# Patient Record
Sex: FEMALE | Race: WHITE | NOT HISPANIC OR LATINO | Employment: FULL TIME | URBAN - METROPOLITAN AREA
[De-identification: names, ages, dates, MRNs, and addresses within clinical notes are randomized per-mention and may not be internally consistent; named-entity substitution may affect disease eponyms.]

---

## 2021-12-22 ENCOUNTER — APPOINTMENT (OUTPATIENT)
Dept: MRI IMAGING | Facility: CLINIC | Age: 36
End: 2021-12-22
Attending: EMERGENCY MEDICINE
Payer: COMMERCIAL

## 2021-12-22 ENCOUNTER — HOSPITAL ENCOUNTER (EMERGENCY)
Facility: CLINIC | Age: 36
Discharge: ANOTHER HOSPITAL | End: 2021-12-23
Attending: EMERGENCY MEDICINE | Admitting: EMERGENCY MEDICINE
Payer: COMMERCIAL

## 2021-12-22 DIAGNOSIS — I63.9 ACUTE CVA (CEREBROVASCULAR ACCIDENT) (H): ICD-10-CM

## 2021-12-22 LAB
ANION GAP SERPL CALCULATED.3IONS-SCNC: 14 MMOL/L (ref 5–18)
APTT PPP: 31 SECONDS (ref 22–38)
ATRIAL RATE - MUSE: 59 BPM
BASOPHILS # BLD AUTO: 0 10E3/UL (ref 0–0.2)
BASOPHILS NFR BLD AUTO: 1 %
BUN SERPL-MCNC: 11 MG/DL (ref 8–22)
CALCIUM SERPL-MCNC: 9.5 MG/DL (ref 8.5–10.5)
CHLORIDE BLD-SCNC: 106 MMOL/L (ref 98–107)
CHOLEST SERPL-MCNC: 219 MG/DL
CO2 SERPL-SCNC: 19 MMOL/L (ref 22–31)
CREAT SERPL-MCNC: 0.63 MG/DL (ref 0.6–1.1)
DIASTOLIC BLOOD PRESSURE - MUSE: NORMAL MMHG
EOSINOPHIL # BLD AUTO: 0.2 10E3/UL (ref 0–0.7)
EOSINOPHIL NFR BLD AUTO: 3 %
ERYTHROCYTE [DISTWIDTH] IN BLOOD BY AUTOMATED COUNT: 11.9 % (ref 10–15)
ERYTHROCYTE [SEDIMENTATION RATE] IN BLOOD BY WESTERGREN METHOD: 12 MM/HR (ref 0–20)
GFR SERPL CREATININE-BSD FRML MDRD: >90 ML/MIN/1.73M2
GLUCOSE BLD-MCNC: 95 MG/DL (ref 70–125)
HBA1C MFR BLD: 5.3 %
HCT VFR BLD AUTO: 41.3 % (ref 35–47)
HDLC SERPL-MCNC: 46 MG/DL
HGB BLD-MCNC: 13.8 G/DL (ref 11.7–15.7)
HOLD SPECIMEN: NORMAL
IMM GRANULOCYTES # BLD: 0 10E3/UL
IMM GRANULOCYTES NFR BLD: 0 %
INR PPP: 0.95 (ref 0.85–1.15)
INTERPRETATION ECG - MUSE: NORMAL
LDLC SERPL CALC-MCNC: 151 MG/DL
LYMPHOCYTES # BLD AUTO: 2.8 10E3/UL (ref 0.8–5.3)
LYMPHOCYTES NFR BLD AUTO: 36 %
MCH RBC QN AUTO: 29.4 PG (ref 26.5–33)
MCHC RBC AUTO-ENTMCNC: 33.4 G/DL (ref 31.5–36.5)
MCV RBC AUTO: 88 FL (ref 78–100)
MONOCYTES # BLD AUTO: 0.4 10E3/UL (ref 0–1.3)
MONOCYTES NFR BLD AUTO: 5 %
NEUTROPHILS # BLD AUTO: 4.2 10E3/UL (ref 1.6–8.3)
NEUTROPHILS NFR BLD AUTO: 55 %
NRBC # BLD AUTO: 0 10E3/UL
NRBC BLD AUTO-RTO: 0 /100
P AXIS - MUSE: 50 DEGREES
PLATELET # BLD AUTO: 183 10E3/UL (ref 150–450)
POTASSIUM BLD-SCNC: 4.3 MMOL/L (ref 3.5–5)
PR INTERVAL - MUSE: 156 MS
QRS DURATION - MUSE: 92 MS
QT - MUSE: 410 MS
QTC - MUSE: 405 MS
R AXIS - MUSE: 33 DEGREES
RBC # BLD AUTO: 4.69 10E6/UL (ref 3.8–5.2)
SARS-COV-2 RNA RESP QL NAA+PROBE: NEGATIVE
SODIUM SERPL-SCNC: 139 MMOL/L (ref 136–145)
SYSTOLIC BLOOD PRESSURE - MUSE: NORMAL MMHG
T AXIS - MUSE: 31 DEGREES
TRIGL SERPL-MCNC: 112 MG/DL
VENTRICULAR RATE- MUSE: 59 BPM
WBC # BLD AUTO: 7.7 10E3/UL (ref 4–11)

## 2021-12-22 PROCEDURE — 70549 MR ANGIOGRAPH NECK W/O&W/DYE: CPT

## 2021-12-22 PROCEDURE — 85610 PROTHROMBIN TIME: CPT | Performed by: EMERGENCY MEDICINE

## 2021-12-22 PROCEDURE — 250N000013 HC RX MED GY IP 250 OP 250 PS 637: Performed by: EMERGENCY MEDICINE

## 2021-12-22 PROCEDURE — 80048 BASIC METABOLIC PNL TOTAL CA: CPT | Performed by: EMERGENCY MEDICINE

## 2021-12-22 PROCEDURE — C9803 HOPD COVID-19 SPEC COLLECT: HCPCS

## 2021-12-22 PROCEDURE — 258N000003 HC RX IP 258 OP 636: Performed by: EMERGENCY MEDICINE

## 2021-12-22 PROCEDURE — 99285 EMERGENCY DEPT VISIT HI MDM: CPT | Mod: 25

## 2021-12-22 PROCEDURE — 85652 RBC SED RATE AUTOMATED: CPT | Performed by: EMERGENCY MEDICINE

## 2021-12-22 PROCEDURE — 85730 THROMBOPLASTIN TIME PARTIAL: CPT | Performed by: EMERGENCY MEDICINE

## 2021-12-22 PROCEDURE — 36415 COLL VENOUS BLD VENIPUNCTURE: CPT | Performed by: EMERGENCY MEDICINE

## 2021-12-22 PROCEDURE — 86038 ANTINUCLEAR ANTIBODIES: CPT | Performed by: EMERGENCY MEDICINE

## 2021-12-22 PROCEDURE — 96375 TX/PRO/DX INJ NEW DRUG ADDON: CPT | Mod: 59

## 2021-12-22 PROCEDURE — 93005 ELECTROCARDIOGRAM TRACING: CPT | Performed by: EMERGENCY MEDICINE

## 2021-12-22 PROCEDURE — 70553 MRI BRAIN STEM W/O & W/DYE: CPT

## 2021-12-22 PROCEDURE — 250N000011 HC RX IP 250 OP 636: Performed by: EMERGENCY MEDICINE

## 2021-12-22 PROCEDURE — 70544 MR ANGIOGRAPHY HEAD W/O DYE: CPT

## 2021-12-22 PROCEDURE — 85025 COMPLETE CBC W/AUTO DIFF WBC: CPT | Performed by: EMERGENCY MEDICINE

## 2021-12-22 PROCEDURE — 96376 TX/PRO/DX INJ SAME DRUG ADON: CPT

## 2021-12-22 PROCEDURE — 96374 THER/PROPH/DIAG INJ IV PUSH: CPT | Mod: 59

## 2021-12-22 PROCEDURE — 96361 HYDRATE IV INFUSION ADD-ON: CPT

## 2021-12-22 PROCEDURE — 255N000002 HC RX 255 OP 636: Performed by: EMERGENCY MEDICINE

## 2021-12-22 PROCEDURE — 80061 LIPID PANEL: CPT | Performed by: EMERGENCY MEDICINE

## 2021-12-22 PROCEDURE — 87635 SARS-COV-2 COVID-19 AMP PRB: CPT | Performed by: EMERGENCY MEDICINE

## 2021-12-22 PROCEDURE — A9585 GADOBUTROL INJECTION: HCPCS | Performed by: EMERGENCY MEDICINE

## 2021-12-22 PROCEDURE — 83036 HEMOGLOBIN GLYCOSYLATED A1C: CPT | Performed by: EMERGENCY MEDICINE

## 2021-12-22 RX ORDER — ACETAMINOPHEN 325 MG/1
975 TABLET ORAL ONCE
Status: COMPLETED | OUTPATIENT
Start: 2021-12-22 | End: 2021-12-22

## 2021-12-22 RX ORDER — MORPHINE SULFATE 4 MG/ML
4 INJECTION, SOLUTION INTRAMUSCULAR; INTRAVENOUS
Status: DISCONTINUED | OUTPATIENT
Start: 2021-12-22 | End: 2021-12-22

## 2021-12-22 RX ORDER — LORAZEPAM 2 MG/ML
1 INJECTION INTRAMUSCULAR
Status: DISCONTINUED | OUTPATIENT
Start: 2021-12-22 | End: 2021-12-23 | Stop reason: HOSPADM

## 2021-12-22 RX ORDER — ONDANSETRON 2 MG/ML
4 INJECTION INTRAMUSCULAR; INTRAVENOUS ONCE
Status: COMPLETED | OUTPATIENT
Start: 2021-12-22 | End: 2021-12-22

## 2021-12-22 RX ORDER — SODIUM CHLORIDE 9 MG/ML
INJECTION, SOLUTION INTRAVENOUS CONTINUOUS
Status: DISCONTINUED | OUTPATIENT
Start: 2021-12-22 | End: 2021-12-23 | Stop reason: HOSPADM

## 2021-12-22 RX ORDER — HYDROMORPHONE HYDROCHLORIDE 1 MG/ML
1 INJECTION, SOLUTION INTRAMUSCULAR; INTRAVENOUS; SUBCUTANEOUS ONCE
Status: COMPLETED | OUTPATIENT
Start: 2021-12-22 | End: 2021-12-22

## 2021-12-22 RX ORDER — IBUPROFEN 600 MG/1
600 TABLET, FILM COATED ORAL ONCE
Status: COMPLETED | OUTPATIENT
Start: 2021-12-22 | End: 2021-12-22

## 2021-12-22 RX ORDER — GADOBUTROL 604.72 MG/ML
8 INJECTION INTRAVENOUS ONCE
Status: COMPLETED | OUTPATIENT
Start: 2021-12-22 | End: 2021-12-22

## 2021-12-22 RX ORDER — LORAZEPAM 2 MG/ML
1 INJECTION INTRAMUSCULAR
Status: DISCONTINUED | OUTPATIENT
Start: 2021-12-22 | End: 2021-12-22

## 2021-12-22 RX ORDER — ACETAMINOPHEN 325 MG/1
650 TABLET ORAL ONCE
Status: COMPLETED | OUTPATIENT
Start: 2021-12-22 | End: 2021-12-22

## 2021-12-22 RX ORDER — ONDANSETRON 2 MG/ML
8 INJECTION INTRAMUSCULAR; INTRAVENOUS ONCE
Status: COMPLETED | OUTPATIENT
Start: 2021-12-22 | End: 2021-12-22

## 2021-12-22 RX ORDER — ASPIRIN 81 MG/1
162 TABLET, CHEWABLE ORAL ONCE
Status: COMPLETED | OUTPATIENT
Start: 2021-12-22 | End: 2021-12-22

## 2021-12-22 RX ADMIN — ONDANSETRON 8 MG: 2 INJECTION INTRAMUSCULAR; INTRAVENOUS at 11:28

## 2021-12-22 RX ADMIN — GADOBUTROL 8 ML: 604.72 INJECTION INTRAVENOUS at 16:15

## 2021-12-22 RX ADMIN — HYDROMORPHONE HYDROCHLORIDE 1 MG: 1 INJECTION, SOLUTION INTRAMUSCULAR; INTRAVENOUS; SUBCUTANEOUS at 17:19

## 2021-12-22 RX ADMIN — ACETAMINOPHEN 650 MG: 325 TABLET ORAL at 22:48

## 2021-12-22 RX ADMIN — SODIUM CHLORIDE: 9 INJECTION, SOLUTION INTRAVENOUS at 19:00

## 2021-12-22 RX ADMIN — LORAZEPAM 1 MG: 2 INJECTION INTRAMUSCULAR; INTRAVENOUS at 14:28

## 2021-12-22 RX ADMIN — SODIUM CHLORIDE 1000 ML: 9 INJECTION, SOLUTION INTRAVENOUS at 17:18

## 2021-12-22 RX ADMIN — ONDANSETRON 4 MG: 2 INJECTION INTRAMUSCULAR; INTRAVENOUS at 19:01

## 2021-12-22 RX ADMIN — ASPIRIN 81 MG CHEWABLE TABLET 162 MG: 81 TABLET CHEWABLE at 17:18

## 2021-12-22 RX ADMIN — ACETAMINOPHEN 975 MG: 325 TABLET ORAL at 11:28

## 2021-12-22 RX ADMIN — IBUPROFEN 600 MG: 600 TABLET ORAL at 22:48

## 2021-12-22 ASSESSMENT — ENCOUNTER SYMPTOMS
NUMBNESS: 1
NAUSEA: 1
HEADACHES: 1
SPEECH DIFFICULTY: 0

## 2021-12-22 ASSESSMENT — MIFFLIN-ST. JEOR: SCORE: 1458.79

## 2021-12-22 NOTE — ED TRIAGE NOTES
Patient states she has had numbness/weakness in her right fingertips starting at 0700 yesterday which has become progressively worse.  She was recently ruled out for carpel tunnel.  Also c/o migraine, she has a history of getting and states it is normal for her, peripheral vision off where she can't see things. Running into walls.   Sent here by  for MRI for changes.

## 2021-12-22 NOTE — ED PROVIDER NOTES
Emergency Department Encounter     Evaluation Date & Time:   12/22/2021  9:48 AM    CHIEF COMPLAINT:  Numbness      Triage Note:Patient states she has had numbness/weakness in her right fingertips starting at 0700 yesterday which has become progressively worse.  She was recently ruled out for carpel tunnel.  Also c/o migraine, she has a history of getting and states it is normal for her, peripheral vision off where she can't see things. Running into walls.   Sent here by  for MRI for changes.    Impression and Plan     ED COURSE & MEDICAL DECISION MAKING:  10:06 AM I met with the patient, obtained history, performed an initial exam, and discussed options and plan for diagnostics and treatment here in the ED. PPE worn: N95, eye protection, gloves, hair cover.  6:37 PM patient signed out to Dr. Mena. If no neuro telemetry bed can be found tonight then would have him consult neurology for further assistance in management over the course of the coming days while waiting for neuro telemetry bed to be available.    ED Course as of 12/22/21 1838   Wed Dec 22, 2021   1206 Onset of symptoms was yesterday morning at 7 AM so no stroke code was called.  Certainly stroke is a possibility with her right hand numbness and associated vision changes.  However, her vision change suggest that this is a peripheral issue either optic nerve or posterior eye.  I am having her speak with her eye doctor while we are evaluating her brain here to see if they are able to get an appointment in the next couple of days to go in for a full eye check for that.  Differential certainly includes retinal hemorrhage, partial retinal detachment, or optic nerve abnormality/venous congestion but at this time she has no pain in that right eye and did not notice herself the loss of vision so though according to her  she has been newly running into things on her right side.  As far as the right hand numbness although she states she has had  evaluation done already and they have not found any signs of carpal tunnel this may now be the development of carpal tunnel syndrome especially as she has been doing more exercises of her hands over the course of the past few months.  Certainly otherwise may be a more diffuse patchy abnormality of the brain such as demyelinating disease, mass, or emboli.  The right hand may also be a focal neuropathy from pressure or position as she is typing doing her daily work.  The difficulty concentrating may be secondary to her recurring migraine.  Less likely this is hemorrhage but obviously her blood pressure was up when she arrived so we will find out when we do her imaging.  She is stable at this time.   1209 Ekg is unremarkable.   1422 Unfortunately the patient had marked on her MRI checklist that she was claustrophobic. Nursing staff had not had a chance to let me know prior to her going over and she did have a panic attack. She is now recovering from that but still feeling anxious and so we are going to give her medication now for anxiety and then she is willing to try again to get the MRI done.   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  Will treat with fluids and aspirin.  rn did bedside dysphagia screen and she has passed, can eat dinner.    At the conclusion of the encounter I discussed the results of all the tests and the disposition. The questions were answered. The patient or family acknowledged understanding and was agreeable with the care plan.      FINAL IMPRESSION:    ICD-10-CM    1. Acute CVA (cerebrovascular accident) (H)  I63.9        0 minutes of critical care time    MEDICATIONS GIVEN IN THE EMERGENCY DEPARTMENT:  Medications   LORazepam (ATIVAN) injection 1 mg (1 mg Intravenous Given 12/22/21 1428)   0.9% sodium chloride BOLUS (1,000 mLs Intravenous New Bag 12/22/21 1718)     Followed by   sodium chloride 0.9% infusion (has no administration in time  range)   ondansetron (ZOFRAN) injection 8 mg (8 mg Intravenous Given 12/22/21 1128)   acetaminophen (TYLENOL) tablet 975 mg (975 mg Oral Given 12/22/21 1128)   gadobutrol (GADAVIST) injection 8 mL (8 mLs Intravenous Given 12/22/21 1615)   HYDROmorphone (PF) (DILAUDID) injection 1 mg (1 mg Intravenous Given 12/22/21 1719)   aspirin (ASA) chewable tablet 162 mg (162 mg Oral Given 12/22/21 1718)       NEW PRESCRIPTIONS STARTED AT TODAY'S ED VISIT:  New Prescriptions    No medications on file     HPI     HPI   Samia Ordoñez is a 36 year old female with a pertinent history of migraines who presents to this ED by walk in for evaluation of right sided headache, vision loss found at urgent care, and r hand numbness.  Patient states that yesterday at about 7 AM she began having problems with right hand numbness and over the course of the day the right hand was having more intermittent episodes of numbness but throughout the day she was having increased difficulty actually coordinating the use of her right hand and to the point that it was affecting her typing and work.  She states that she has been evaluated for carpal tunnel because of some discomfort in her wrist but that they have found that there is no carpal tunnel and she has been doing hand strengthening exercises over the course of the past 6 months and has now just graduated that physical therapy and has been doing well.     Then overnight she developed a right-sided migraine over her right eyebrow and took her ibuprofen this morning at about 5 AM for that.  She took 800 mg of ibuprofen.  Typically that does help to get rid of the migraine if she can do that and get some sleep but this morning it is not getting rid of the migraine.  She did not notice any loss of vision but her  did notice that yesterday she seemed to be running into things on her right side as well.  Otherwise no problems with speech.  Today she does feel like she is having some more  "problems with concentration.  Her typical migraine is right-sided and if it gets bad it will be accompanied by nausea but she has never had visual issues with her migraines previously.  She does wear glasses but has not noticed a loss of vision in her right eye.  She does have tubal ligation her last menstrual period was 2 weeks prior to presentation.    REVIEW OF SYSTEMS:  Review of Systems   Eyes: Positive for visual disturbance ( found patient was running into things on her right side).   Gastrointestinal: Positive for nausea (with migraine).   Neurological: Positive for numbness (R hand) and headaches (right sided). Negative for speech difficulty.       Medical History     History reviewed. No pertinent past medical history.    History reviewed. No pertinent surgical history.    History reviewed. No pertinent family history.    Social History     Tobacco Use     Smoking status: None     Smokeless tobacco: None   Substance Use Topics     Alcohol use: None     Drug use: None       No current outpatient medications on file.      Physical Exam     First Vitals:  Patient Vitals for the past 24 hrs:   BP Temp Temp src Pulse Resp SpO2 Height Weight   12/22/21 1837 -- -- -- 79 17 91 % -- --   12/22/21 1830 -- -- -- 84 20 92 % -- --   12/22/21 1815 -- -- -- 86 25 93 % -- --   12/22/21 1800 (!) 153/82 -- -- -- -- -- -- --   12/22/21 1756 -- -- -- 94 27 93 % -- --   12/22/21 1445 -- -- -- 77 -- 96 % -- --   12/22/21 1430 -- -- -- 75 -- 95 % -- --   12/22/21 1315 (!) 144/99 -- -- 78 18 97 % -- --   12/22/21 1300 (!) 141/100 -- -- 68 17 96 % -- --   12/22/21 1245 (!) 149/93 -- -- 74 18 98 % -- --   12/22/21 1230 139/83 -- -- 74 22 97 % -- --   12/22/21 1215 (!) 157/93 -- -- 74 15 97 % -- --   12/22/21 1200 (!) 160/80 -- -- 74 18 97 % -- --   12/22/21 1145 (!) 167/83 -- -- 70 16 96 % -- --   12/22/21 0952 (!) 162/100 98  F (36.7  C) Oral 83 16 96 % 1.626 m (5' 4\") 79.4 kg (175 lb)       PHYSICAL EXAM:   Constitutional: "   In nad sitting up in bed   HENT:  Normocephalic, posterior pharynx wnl, mucous membranes moist and dark pink   Eyes:  PERRL, EOMI, Conjunctiva normal, No discharge, no scleral icterus.  Respiratory:  Breathing easily, cta  Cardiovascular:  rrr but has frequent early beats about every 4th or 5th beat nl s1s2 0 murmurs, rubs, or gallops.  Peripheral pulses dp, pt, and radial are wnl.  no peripheral edema   GI:  Bowel sounds normal, Soft, No tenderness, No flank tenderness, nondistended.  :No CVA tenderness.   Musculoskeletal:  Moves all extremities.  No erythematous or swollen major joints,   Integument: normal color  Lymphatic:  No cervical lymphadenopathy  Neurologic:  Alert & oriented x 3, cn 2-12 show loss of vision peripherally only on her right eye.  The left vision is without visual field cut and she can read my badge with her glasses on.  Normal speech.  No aphasia.  Has numbness r hand only that is not exacerbated by flexion/extension of the wrist.  No weakness in either hand.  No facial or leg numbness.      [employer] Stroke Scale  Exam Interval: Baseline   Score    Level of consciousness: (0)   Alert, keenly responsive    LOC questions: (0)   Answers both questions correctly    LOC commands: (0)   Performs both tasks correctly    Best gaze: (0)   Normal    Visual: (1)   Partial hemianopia    Facial palsy: (0)   Normal symmetrical movements    Motor arm (left): (0)   No drift    Motor arm (right): (0)   No drift    Motor leg (left): (0)   No drift    Motor leg (right): (0)   No drift    Limb ataxia: (0)   Absent    Sensory: (1)   Mild to moderate sensory loss    Best language: (0)   Normal- no aphasia    Dysarthria: (0)   Normal    Extinction and inattention: (0)   No abnormality        Total Score:  2     Psychiatric:  Affect normal, Judgment normal, Mood normal.     Results     LAB:  All pertinent labs reviewed and interpreted  Results for orders placed or performed during the  hospital encounter of 12/22/21   MR Brain w/o & w Contrast     Status: None    Narrative    EXAM: MR BRAIN W/O AND W CONTRAST, MRA NECK (CAROTIDS) WO AND W CONTRAST, MRA BRAIN ([place] [facility]) WO CONTRAST  LOCATION: [facility]  DATE/TIME: 12/22/2021 1:24 PM    INDICATION: Onset yesterday of right hand numbness/coordination issues. Peripheral vision loss.  COMPARISON: None.  CONTRAST: 8 mL Gadavist (accession [id number]), 8 ml Gadavist (accession [id number]), 8 mL Gadavist IV.  TECHNIQUE:   1) Routine multiplanar multisequence head MRI without and with intravenous contrast.  2) 3D time-of-flight head MRA without intravenous contrast.  3) Neck MRA without and with IV contrast. Stenosis measurements made according to NASCET criteria unless otherwise specified.    FINDINGS:  HEAD MRI:  INTRACRANIAL CONTENTS: Patchy areas of FLAIR hyperintensity and restricted diffusion are seen within the left frontal parietal and occipital lobes demonstrating an MCA MAYO watershed type distribution and most evident along the left posterior parietal   region where the area of ischemia measures at least 2.3 x 2.6 cm in axial dimension. There is a small focus of restricted diffusion along the left corona radiata/upper margin left basal ganglia. No areas of abnormal parenchymal susceptibility. No areas   of abnormal enhancement.    Ventricles are normal in size. Otherwise normal gray-white matter differentiation. Although there is some gyriform swelling in the area of ischemia, no significant mass effect or midline shift.    Cerebellar tonsils are normally positioned. Visualized upper cervical spinal cord, sella, and corpus callosum are unremarkable. Major skull base vascular flow-voids are preserved.    OSSEOUS STRUCTURES/SOFT TISSUES: Visualized marrow space is unremarkable.     ORBITS: No abnormality accounting for technique.     SINUSES/MASTOIDS: Mild paranasal sinus mucosal thickening with dependent  air-fluid level right maxillary sinus suggesting active sinus disease. Middle ear cavities and mastoid air cells are clear.     HEAD MRA: Exam is at least mildly degraded by motion artifact.  ANTERIOR CIRCULATION: Intracranial internal carotid arteries are unremarkable. Although assessment of the middle cerebral arteries is degraded by motion artifact, there appears to be a short segment flow gap/filling defect within the M1 segment of the   left MCA spanning roughly 1.5 mm segment (axial source image 99). On henrry bolus MRA this corresponds to an area of apparent severe focal stenosis (source image 39 of series 18).    Best appreciated on the MIP images, there is a band of artifactual narrowing within the distal MCAs and ACAs. Otherwise there is grossly normal flow-related enhancement within the more distal left MCA, ACAs, and right middle cerebral artery.    POSTERIOR CIRCULATION: Left vertebral artery is slightly larger than the right. Vertebral and basilar arteries are unremarkable. Right posterior communicating artery is patent and provides majority of flow to the right posterior cerebral artery. Right P1   segment not identified. Posterior cerebral arteries otherwise patent.    NECK MRA:   RIGHT CAROTID: No measurable stenosis or dissection.    LEFT CAROTID: No measurable stenosis or dissection.    VERTEBRAL ARTERIES: Left vertebral artery is dominant. Both cervicovertebral arteries are patent.     AORTIC ARCH: Visualized aortic arch is unremarkable.      Impression    IMPRESSION:  HEAD MRI:   1.  Patchy areas of restricted diffusion are seen within the medial aspect of the left cerebral hemisphere demonstrating an MCA MAYO watershed type distribution. No finding for hemorrhagic transformation.    2.  Mild paranasal sinus mucosal thickening with right maxillary sinus dependent air-fluid level compatible with active sinus disease.    HEAD MRA:   1.  Exam is degraded by motion artifact.    2.  Short segment severe  stenosis M1 segment of the left MCA.    3.  Allowing for artifact, more distal middle and anterior cerebral arteries are patent.    NECK MRA:  1.  No significant stenosis either cervical carotid or vertebral system. No findings for dissection.    Findings called to Dr. Georges at 4:52 PM.   MRA Brain ([place] [facility] [place]) wo Contrast     Status: None    Narrative    EXAM: MR BRAIN W/O AND W CONTRAST, MRA NECK (CAROTIDS) WO AND W CONTRAST, MRA BRAIN ([place] [facility] [place]) WO CONTRAST  LOCATION: [facility]  DATE/TIME: 12/22/2021 1:24 PM    INDICATION: Onset yesterday of right hand numbness/coordination issues. Peripheral vision loss.  COMPARISON: None.  CONTRAST: 8 mL Gadavist (accession [id number]), 8 ml Gadavist (accession [id number]), 8 mL Gadavist IV.  TECHNIQUE:   1) Routine multiplanar multisequence head MRI without and with intravenous contrast.  2) 3D time-of-flight head MRA without intravenous contrast.  3) Neck MRA without and with IV contrast. Stenosis measurements made according to NASCET criteria unless otherwise specified.    FINDINGS:  HEAD MRI:  INTRACRANIAL CONTENTS: Patchy areas of FLAIR hyperintensity and restricted diffusion are seen within the left frontal parietal and occipital lobes demonstrating an MCA MAYO watershed type distribution and most evident along the left posterior parietal   region where the area of ischemia measures at least 2.3 x 2.6 cm in axial dimension. There is a small focus of restricted diffusion along the left corona radiata/upper margin left basal ganglia. No areas of abnormal parenchymal susceptibility. No areas   of abnormal enhancement.    Ventricles are normal in size. Otherwise normal gray-white matter differentiation. Although there is some gyriform swelling in the area of ischemia, no significant mass effect or midline shift.    Cerebellar tonsils are normally positioned. Visualized upper cervical spinal cord, sella, and corpus callosum are  unremarkable. Major skull base vascular flow-voids are preserved.    OSSEOUS STRUCTURES/SOFT TISSUES: Visualized marrow space is unremarkable.     ORBITS: No abnormality accounting for technique.     SINUSES/MASTOIDS: Mild paranasal sinus mucosal thickening with dependent air-fluid level right maxillary sinus suggesting active sinus disease. Middle ear cavities and mastoid air cells are clear.     HEAD MRA: Exam is at least mildly degraded by motion artifact.  ANTERIOR CIRCULATION: Intracranial internal carotid arteries are unremarkable. Although assessment of the middle cerebral arteries is degraded by motion artifact, there appears to be a short segment flow gap/filling defect within the M1 segment of the   left MCA spanning roughly 1.5 mm segment (axial source image 99). On henrry bolus MRA this corresponds to an area of apparent severe focal stenosis (source image 39 of series 18).    Best appreciated on the MIP images, there is a band of artifactual narrowing within the distal MCAs and ACAs. Otherwise there is grossly normal flow-related enhancement within the more distal left MCA, ACAs, and right middle cerebral artery.    POSTERIOR CIRCULATION: Left vertebral artery is slightly larger than the right. Vertebral and basilar arteries are unremarkable. Right posterior communicating artery is patent and provides majority of flow to the right posterior cerebral artery. Right P1   segment not identified. Posterior cerebral arteries otherwise patent.    NECK MRA:   RIGHT CAROTID: No measurable stenosis or dissection.    LEFT CAROTID: No measurable stenosis or dissection.    VERTEBRAL ARTERIES: Left vertebral artery is dominant. Both cervicovertebral arteries are patent.     AORTIC ARCH: Visualized aortic arch is unremarkable.      Impression    IMPRESSION:  HEAD MRI:   1.  Patchy areas of restricted diffusion are seen within the medial aspect of the left cerebral hemisphere demonstrating an MCA MAYO watershed type  distribution. No finding for hemorrhagic transformation.    2.  Mild paranasal sinus mucosal thickening with right maxillary sinus dependent air-fluid level compatible with active sinus disease.    HEAD MRA:   1.  Exam is degraded by motion artifact.    2.  Short segment severe stenosis M1 segment of the left MCA.    3.  Allowing for artifact, more distal middle and anterior cerebral arteries are patent.    NECK MRA:  1.  No significant stenosis either cervical carotid or vertebral system. No findings for dissection.    Findings called to Dr. Georges at 4:52 PM.   MRA Neck (Carotids) wo & w Contrast     Status: None    Narrative    EXAM: MR BRAIN W/O AND W CONTRAST, MRA NECK (CAROTIDS) WO AND W CONTRAST, MRA BRAIN ([place] [facility]) WO CONTRAST  LOCATION: [facility]  DATE/TIME: 12/22/2021 1:24 PM    INDICATION: Onset yesterday of right hand numbness/coordination issues. Peripheral vision loss.  COMPARISON: None.  CONTRAST: 8 mL Gadavist (accession [id number]), 8 ml Gadavist (accession [id number]), 8 mL Gadavist IV.  TECHNIQUE:   1) Routine multiplanar multisequence head MRI without and with intravenous contrast.  2) 3D time-of-flight head MRA without intravenous contrast.  3) Neck MRA without and with IV contrast. Stenosis measurements made according to NASCET criteria unless otherwise specified.    FINDINGS:  HEAD MRI:  INTRACRANIAL CONTENTS: Patchy areas of FLAIR hyperintensity and restricted diffusion are seen within the left frontal parietal and occipital lobes demonstrating an MCA MAYO watershed type distribution and most evident along the left posterior parietal   region where the area of ischemia measures at least 2.3 x 2.6 cm in axial dimension. There is a small focus of restricted diffusion along the left corona radiata/upper margin left basal ganglia. No areas of abnormal parenchymal susceptibility. No areas   of abnormal enhancement.    Ventricles are normal in size. Otherwise  normal gray-white matter differentiation. Although there is some gyriform swelling in the area of ischemia, no significant mass effect or midline shift.    Cerebellar tonsils are normally positioned. Visualized upper cervical spinal cord, sella, and corpus callosum are unremarkable. Major skull base vascular flow-voids are preserved.    OSSEOUS STRUCTURES/SOFT TISSUES: Visualized marrow space is unremarkable.     ORBITS: No abnormality accounting for technique.     SINUSES/MASTOIDS: Mild paranasal sinus mucosal thickening with dependent air-fluid level right maxillary sinus suggesting active sinus disease. Middle ear cavities and mastoid air cells are clear.     HEAD MRA: Exam is at least mildly degraded by motion artifact.  ANTERIOR CIRCULATION: Intracranial internal carotid arteries are unremarkable. Although assessment of the middle cerebral arteries is degraded by motion artifact, there appears to be a short segment flow gap/filling defect within the M1 segment of the   left MCA spanning roughly 1.5 mm segment (axial source image 99). On henrry bolus MRA this corresponds to an area of apparent severe focal stenosis (source image 39 of series 18).    Best appreciated on the MIP images, there is a band of artifactual narrowing within the distal MCAs and ACAs. Otherwise there is grossly normal flow-related enhancement within the more distal left MCA, ACAs, and right middle cerebral artery.    POSTERIOR CIRCULATION: Left vertebral artery is slightly larger than the right. Vertebral and basilar arteries are unremarkable. Right posterior communicating artery is patent and provides majority of flow to the right posterior cerebral artery. Right P1   segment not identified. Posterior cerebral arteries otherwise patent.    NECK MRA:   RIGHT CAROTID: No measurable stenosis or dissection.    LEFT CAROTID: No measurable stenosis or dissection.    VERTEBRAL ARTERIES: Left vertebral artery is dominant. Both cervicovertebral  arteries are patent.     AORTIC ARCH: Visualized aortic arch is unremarkable.      Impression    IMPRESSION:  HEAD MRI:   1.  Patchy areas of restricted diffusion are seen within the medial aspect of the left cerebral hemisphere demonstrating an MCA MAYO watershed type distribution. No finding for hemorrhagic transformation.    2.  Mild paranasal sinus mucosal thickening with right maxillary sinus dependent air-fluid level compatible with active sinus disease.    HEAD MRA:   1.  Exam is degraded by motion artifact.    2.  Short segment severe stenosis M1 segment of the left MCA.    3.  Allowing for artifact, more distal middle and anterior cerebral arteries are patent.    NECK MRA:  1.  No significant stenosis either cervical carotid or vertebral system. No findings for dissection.    Findings called to Dr. Georges at 4:52 PM.   Basic metabolic panel     Status: Abnormal   Result Value Ref Range    Sodium 139 136 - 145 mmol/L    Potassium 4.3 3.5 - 5.0 mmol/L    Chloride 106 98 - 107 mmol/L    Carbon Dioxide (CO2) 19 (L) 22 - 31 mmol/L    Anion Gap 14 5 - 18 mmol/L    Urea Nitrogen 11 8 - 22 mg/dL    Creatinine 0.63 0.60 - 1.10 mg/dL    Calcium 9.5 8.5 - 10.5 mg/dL    Glucose 95 70 - 125 mg/dL    GFR Estimate >90 >60 mL/min/1.73m2   INR     Status: Normal   Result Value Ref Range    INR 0.95 0.85 - 1.15   Partial thromboplastin time     Status: Normal   Result Value Ref Range    aPTT 31 22 - 38 Seconds   CBC with platelets and differential     Status: None   Result Value Ref Range    WBC Count 7.7 4.0 - 11.0 10e3/uL    RBC Count 4.69 3.80 - 5.20 10e6/uL    Hemoglobin 13.8 11.7 - 15.7 g/dL    Hematocrit 41.3 35.0 - 47.0 %    MCV 88 78 - 100 fL    MCH 29.4 26.5 - 33.0 pg    MCHC 33.4 31.5 - 36.5 g/dL    RDW 11.9 10.0 - 15.0 %    Platelet Count 183 150 - 450 10e3/uL    % Neutrophils 55 %    % Lymphocytes 36 %    % Monocytes 5 %    % Eosinophils 3 %    % Basophils 1 %    % Immature Granulocytes 0 %    NRBCs per 100 WBC  0 <1 /100    Absolute Neutrophils 4.2 1.6 - 8.3 10e3/uL    Absolute Lymphocytes 2.8 0.8 - 5.3 10e3/uL    Absolute Monocytes 0.4 0.0 - 1.3 10e3/uL    Absolute Eosinophils 0.2 0.0 - 0.7 10e3/uL    Absolute Basophils 0.0 0.0 - 0.2 10e3/uL    Absolute Immature Granulocytes 0.0 <=0.4 10e3/uL    Absolute NRBCs 0.0 10e3/uL   Extra Green Top (Lithium Heparin) Tube     Status: None   Result Value Ref Range    Hold Specimen [facility]    ECG 12-LEAD WITH MUSE (L[facility])     Status: None   Result Value Ref Range    Systolic Blood Pressure  mmHg    Diastolic Blood Pressure  mmHg    Ventricular Rate 59 BPM    Atrial Rate 59 BPM    [place] Interval 156 ms    QRS Duration 92 ms     ms    QTc 405 ms    P Axis 50 degrees    R AXIS 33 degrees    T Axis 31 degrees    Interpretation ECG       Sinus bradycardia with sinus arrhythmia  Cannot rule out Anterior infarct , age undetermined  Abnormal ECG  No previous ECGs available  Confirmed by SEE ED PROVIDER NOTE FOR, ECG INTERPRETATION ([id number]),  Khadijah Spence ([id number]) on 12/22/2021 12:06:58 PM     CBC with platelets differential     Status: None    Narrative    The following orders were created for panel order CBC with platelets differential.  Procedure                               Abnormality         Status                     ---------                               -----------         ------                     CBC with platelets and d...[[id number]]                      Final result                 Please view results for these tests on the individual orders.   [facility] Draw     Status: None    Narrative    The following orders were created for panel order [facility] Draw.  Procedure                               Abnormality         Status                     ---------                               -----------         ------                     Extra Green Top (Lithium...[[id number]]                      Final result                 Please view results for these tests on the individual orders.        RADIOLOGY:  MRA Neck (Carotids) wo & w Contrast   Final Result   IMPRESSION:   HEAD MRI:    1.  Patchy areas of restricted diffusion are seen within the medial aspect of the left cerebral hemisphere demonstrating an MCA MAYO watershed type distribution. No finding for hemorrhagic transformation.      2.  Mild paranasal sinus mucosal thickening with right maxillary sinus dependent air-fluid level compatible with active sinus disease.      HEAD MRA:    1.  Exam is degraded by motion artifact.      2.  Short segment severe stenosis M1 segment of the left MCA.      3.  Allowing for artifact, more distal middle and anterior cerebral arteries are patent.      NECK MRA:   1.  No significant stenosis either cervical carotid or vertebral system. No findings for dissection.      Findings called to Dr. Georges at 4:52 PM.      MRA Brain ([place]) wo Contrast   Final Result   IMPRESSION:   HEAD MRI:    1.  Patchy areas of restricted diffusion are seen within the medial aspect of the left cerebral hemisphere demonstrating an MCA MAYO watershed type distribution. No finding for hemorrhagic transformation.      2.  Mild paranasal sinus mucosal thickening with right maxillary sinus dependent air-fluid level compatible with active sinus disease.      HEAD MRA:    1.  Exam is degraded by motion artifact.      2.  Short segment severe stenosis M1 segment of the left MCA.      3.  Allowing for artifact, more distal middle and anterior cerebral arteries are patent.      NECK MRA:   1.  No significant stenosis either cervical carotid or vertebral system. No findings for dissection.      Findings called to Dr. Georges at 4:52 PM.      MR Brain w/o & w Contrast   Final Result   IMPRESSION:   HEAD MRI:    1.  Patchy areas of restricted diffusion are seen within the medial aspect of the left cerebral hemisphere demonstrating an MCA MAYO watershed type distribution. No finding for hemorrhagic transformation.      2.  Mild  paranasal sinus mucosal thickening with right maxillary sinus dependent air-fluid level compatible with active sinus disease.      HEAD MRA:    1.  Exam is degraded by motion artifact.      2.  Short segment severe stenosis M1 segment of the left MCA.      3.  Allowing for artifact, more distal middle and anterior cerebral arteries are patent.      NECK MRA:   1.  No significant stenosis either cervical carotid or vertebral system. No findings for dissection.      Findings called to Dr. Georges at 4:52 PM.          ECG:  Performed at: 1140    Impression: nsb with sinus arrhythmia, rate of 59.,  Poor r wave progression in v3 cannot rule out anterior infarct, no previous ekg available for comparison.  Pr 156ms, qrs 92ms, qtc 405ms, prt axes 50 33 31.      I have independently reviewed and interpreted the EKS(s) documented above    PROCEDURES:  Procedures:             The creation of this record is based on the scribe s observations of the work being performed by Jak Greer and the provider s statements to them. This document has been checked and approved by MD Dayanna Maynard MD  Emergency Medicine  [facility] EMERGENCY ROOM     Dayanna Georges MD  12/22/21 7421

## 2021-12-23 ENCOUNTER — HOSPITAL ENCOUNTER (INPATIENT)
Facility: CLINIC | Age: 36
LOS: 1 days | Discharge: HOME | DRG: 065 | End: 2021-12-24
Attending: PSYCHIATRY & NEUROLOGY | Admitting: PSYCHIATRY & NEUROLOGY
Payer: COMMERCIAL

## 2021-12-23 ENCOUNTER — APPOINTMENT (OUTPATIENT)
Dept: CARDIOLOGY | Facility: CLINIC | Age: 36
End: 2021-12-23
Attending: EMERGENCY MEDICINE
Payer: COMMERCIAL

## 2021-12-23 VITALS
HEART RATE: 88 BPM | TEMPERATURE: 98 F | DIASTOLIC BLOOD PRESSURE: 97 MMHG | BODY MASS INDEX: 29.88 KG/M2 | OXYGEN SATURATION: 98 % | RESPIRATION RATE: 22 BRPM | HEIGHT: 64 IN | WEIGHT: 175 LBS | SYSTOLIC BLOOD PRESSURE: 182 MMHG

## 2021-12-23 DIAGNOSIS — I63.89: ICD-10-CM

## 2021-12-23 DIAGNOSIS — I63.9 ACUTE ISCHEMIC STROKE (H): Primary | ICD-10-CM

## 2021-12-23 PROBLEM — E78.5 HYPERLIPIDEMIA LDL GOAL <70: Status: ACTIVE | Noted: 2021-12-23

## 2021-12-23 PROBLEM — I67.2 INTRACRANIAL ATHEROSCLEROSIS: Status: ACTIVE | Noted: 2021-12-23

## 2021-12-23 LAB — LVEF ECHO: NORMAL

## 2021-12-23 PROCEDURE — 250N000011 HC RX IP 250 OP 636: Performed by: EMERGENCY MEDICINE

## 2021-12-23 PROCEDURE — 93306 TTE W/DOPPLER COMPLETE: CPT | Mod: 26 | Performed by: INTERNAL MEDICINE

## 2021-12-23 PROCEDURE — 999N000208 ECHOCARDIOGRAM COMPLETE

## 2021-12-23 PROCEDURE — 250N000013 HC RX MED GY IP 250 OP 250 PS 637: Performed by: PSYCHIATRY & NEUROLOGY

## 2021-12-23 PROCEDURE — 250N000013 HC RX MED GY IP 250 OP 250 PS 637: Performed by: EMERGENCY MEDICINE

## 2021-12-23 PROCEDURE — 99221 1ST HOSP IP/OBS SF/LOW 40: CPT | Performed by: PSYCHIATRY & NEUROLOGY

## 2021-12-23 PROCEDURE — 258N000003 HC RX IP 258 OP 636: Performed by: EMERGENCY MEDICINE

## 2021-12-23 RX ORDER — ALBUTEROL SULFATE 90 UG/1
2 AEROSOL, METERED RESPIRATORY (INHALATION) EVERY 6 HOURS PRN
COMMUNITY

## 2021-12-23 RX ORDER — NAPROXEN 500 MG/1
500 TABLET ORAL 2 TIMES DAILY PRN
COMMUNITY

## 2021-12-23 RX ORDER — FENTANYL CITRATE 50 UG/ML
50 INJECTION, SOLUTION INTRAMUSCULAR; INTRAVENOUS ONCE
Status: COMPLETED | OUTPATIENT
Start: 2021-12-23 | End: 2021-12-23

## 2021-12-23 RX ORDER — ASPIRIN 81 MG/1
81 TABLET ORAL DAILY
Status: DISCONTINUED | OUTPATIENT
Start: 2021-12-24 | End: 2021-12-23 | Stop reason: HOSPADM

## 2021-12-23 RX ORDER — ASPIRIN 325 MG
325 TABLET ORAL DAILY
Status: DISCONTINUED | OUTPATIENT
Start: 2021-12-23 | End: 2021-12-23

## 2021-12-23 RX ORDER — ATORVASTATIN CALCIUM 10 MG/1
20 TABLET, FILM COATED ORAL DAILY
Status: DISCONTINUED | OUTPATIENT
Start: 2021-12-23 | End: 2021-12-23

## 2021-12-23 RX ORDER — ATORVASTATIN CALCIUM 40 MG/1
40 TABLET, FILM COATED ORAL DAILY
Status: DISCONTINUED | OUTPATIENT
Start: 2021-12-24 | End: 2021-12-23 | Stop reason: HOSPADM

## 2021-12-23 RX ADMIN — SODIUM CHLORIDE: 9 INJECTION, SOLUTION INTRAVENOUS at 04:57

## 2021-12-23 RX ADMIN — TICAGRELOR 90 MG: 90 TABLET ORAL at 19:48

## 2021-12-23 RX ADMIN — FENTANYL CITRATE 50 MCG: 50 INJECTION INTRAMUSCULAR; INTRAVENOUS at 08:15

## 2021-12-23 RX ADMIN — FENTANYL CITRATE 50 MCG: 50 INJECTION INTRAMUSCULAR; INTRAVENOUS at 12:10

## 2021-12-23 RX ADMIN — ATORVASTATIN CALCIUM 20 MG: 10 TABLET, FILM COATED ORAL at 11:44

## 2021-12-23 RX ADMIN — FENTANYL CITRATE 50 MCG: 50 INJECTION INTRAMUSCULAR; INTRAVENOUS at 00:43

## 2021-12-23 RX ADMIN — ASPIRIN 325 MG ORAL TABLET 325 MG: 325 PILL ORAL at 08:14

## 2021-12-23 RX ADMIN — SODIUM CHLORIDE 125 ML: 9 INJECTION, SOLUTION INTRAVENOUS at 14:03

## 2021-12-23 NOTE — ED PROVIDER NOTES
EMERGENCY DEPARTMENT SIGN OUT NOTE        ED COURSE AND MEDICAL DECISION MAKING  6:25 PM Patient was signed out to me by Dr Dayanna Georges.  8:52 PM I discussed the case with neurology, Dr. Ochoa.     In brief, Samia Ordoñez is a 36 year old female who initially presented with right hand numbness, right-sided headache, and vision changes. Onset of symptoms was yesterday morning at 7 AM so no stroke code was called.     She was signed out to me pending neurology consult and neuro tele bed placement.    I met the patient later on in this ED shift.  Unfortunately despite multiple calls there were no beds available for admission at stroke capable center.  I did discuss the case with Dr. Mejía of the neurology service here to continue her evaluation.  Added on some additional labs including A1c, lipid panel, KATIE, and ESR.  Ordered an echocardiogram to be completed tomorrow if the patient remains here.  Was given half dose aspirin by previous provider, will order full dose aspirin to start tomorrow.  At her age and with her significant stenosis, certainly will require admission for formal neurology consultation and continued expedited stroke evaluation.  She remained stable with a persistent headache but no other immediate concerns.  She was given some Tylenol, ibuprofen, and low-dose fentanyl.  Updated the patient and she was in agreement with the plan so far.  Patient to be signed out to overnight provider Dr. Kam.      FINAL IMPRESSION    1. Acute CVA (cerebrovascular accident) (H)        ED MEDS  Medications   LORazepam (ATIVAN) injection 1 mg (1 mg Intravenous Given 12/22/21 1428)   0.9% sodium chloride BOLUS (0 mLs Intravenous Stopped 12/22/21 1900)     Followed by   sodium chloride 0.9% infusion ( Intravenous New Bag 12/22/21 1900)   ondansetron (ZOFRAN) injection 8 mg (8 mg Intravenous Given 12/22/21 1128)   acetaminophen (TYLENOL) tablet 975 mg (975 mg Oral Given 12/22/21 1128)   gadobutrol (GADAVIST)  injection 8 mL (8 mLs Intravenous Given 12/22/21 1615)   HYDROmorphone (PF) (DILAUDID) injection 1 mg (1 mg Intravenous Given 12/22/21 1719)   aspirin (ASA) chewable tablet 162 mg (162 mg Oral Given 12/22/21 1718)   ondansetron (ZOFRAN) injection 4 mg (4 mg Intravenous Given 12/22/21 1901)       LAB  Labs Ordered and Resulted from Time of ED Arrival to Time of ED Departure   BASIC METABOLIC PANEL - Abnormal       Result Value    Sodium 139      Potassium 4.3      Chloride 106      Carbon Dioxide (CO2) 19 (*)     Anion Gap 14      Urea Nitrogen 11      Creatinine 0.63      Calcium 9.5      Glucose 95      GFR Estimate >90     LIPID REFLEX TO DIRECT LDL PANEL - Abnormal    Cholesterol 219 (*)     Triglycerides 112      Direct Measure HDL 46 (*)     LDL Cholesterol Calculated 151 (*)    INR - Normal    INR 0.95     PARTIAL THROMBOPLASTIN TIME - Normal    aPTT 31     COVID-19 VIRUS (CORONAVIRUS) BY PCR - Normal    SARS CoV2 PCR Negative     HEMOGLOBIN A1C - Normal    Hemoglobin A1C 5.3     CBC WITH PLATELETS AND DIFFERENTIAL    WBC Count 7.7      RBC Count 4.69      Hemoglobin 13.8      Hematocrit 41.3      MCV 88      MCH 29.4      MCHC 33.4      RDW 11.9      Platelet Count 183      % Neutrophils 55      % Lymphocytes 36      % Monocytes 5      % Eosinophils 3      % Basophils 1      % Immature Granulocytes 0      NRBCs per 100 WBC 0      Absolute Neutrophils 4.2      Absolute Lymphocytes 2.8      Absolute Monocytes 0.4      Absolute Eosinophils 0.2      Absolute Basophils 0.0      Absolute Immature Granulocytes 0.0      Absolute NRBCs 0.0     ANTI NUCLEAR HERNANDO IGG BY IFA WITH REFLEX   ERYTHROCYTE SEDIMENTATION RATE AUTO       RADIOLOGY    MRA Neck (Carotids) wo & w Contrast   Final Result   IMPRESSION:   HEAD MRI:    1.  Patchy areas of restricted diffusion are seen within the medial aspect of the left cerebral hemisphere demonstrating an MCA MAYO watershed type distribution. No finding for hemorrhagic transformation.       2.  Mild paranasal sinus mucosal thickening with right maxillary sinus dependent air-fluid level compatible with active sinus disease.      HEAD MRA:    1.  Exam is degraded by motion artifact.      2.  Short segment severe stenosis M1 segment of the left MCA.      3.  Allowing for artifact, more distal middle and anterior cerebral arteries are patent.      NECK MRA:   1.  No significant stenosis either cervical carotid or vertebral system. No findings for dissection.      Findings called to Dr. Georges at 4:52 PM.      MRA Brain ([place]) wo Contrast   Final Result   IMPRESSION:   HEAD MRI:    1.  Patchy areas of restricted diffusion are seen within the medial aspect of the left cerebral hemisphere demonstrating an MCA MAYO watershed type distribution. No finding for hemorrhagic transformation.      2.  Mild paranasal sinus mucosal thickening with right maxillary sinus dependent air-fluid level compatible with active sinus disease.      HEAD MRA:    1.  Exam is degraded by motion artifact.      2.  Short segment severe stenosis M1 segment of the left MCA.      3.  Allowing for artifact, more distal middle and anterior cerebral arteries are patent.      NECK MRA:   1.  No significant stenosis either cervical carotid or vertebral system. No findings for dissection.      Findings called to Dr. Georges at 4:52 PM.      MR Brain w/o & w Contrast   Final Result   IMPRESSION:   HEAD MRI:    1.  Patchy areas of restricted diffusion are seen within the medial aspect of the left cerebral hemisphere demonstrating an MCA MAYO watershed type distribution. No finding for hemorrhagic transformation.      2.  Mild paranasal sinus mucosal thickening with right maxillary sinus dependent air-fluid level compatible with active sinus disease.      HEAD MRA:    1.  Exam is degraded by motion artifact.      2.  Short segment severe stenosis M1 segment of the left MCA.      3.  Allowing for artifact, more distal middle and  anterior cerebral arteries are patent.      NECK MRA:   1.  No significant stenosis either cervical carotid or vertebral system. No findings for dissection.      Findings called to Dr. Georges at 4:52 PM.      Echocardiogram Complete    (Results Pending)         I, Alie Barakat, am serving as a scribe to document services personally performed by Dr. Mena, based on my observations and the provider's statements to me. I, Bridger Mena MD, attest that Alie Barakat is acting in a scribe capacity, has observed my performance of the services and has documented them in accordance with my direction.     Bridger Mena MD  Emergency Medicine  [facility] EMERGENCY ROOM  [facility] [facility] [address] [phone]       Bridger Mena MD  12/23/21 0031

## 2021-12-23 NOTE — ED PROVIDER NOTES
eMERGENCY dEPARTMENT PROGRESS NOTE      Patient was signed out to me by Dr. Mena at 1:49 AM.      HPI   Samia Ordoñez is a 36 year old female with a pertinent history of migraines who presents to this ED by walk in for evaluation of right sided headache, vision loss found at urgent care, and right hand numbness.  Patient states that yesterday at about 7 AM she began having problems with right hand numbness and over the course of the day the right hand was having more intermittent episodes of numbness but throughout the day, and she was having increased difficulty actually coordinating the use of her right hand and to the point that it was affecting her typing and work.     Then overnight she developed a right-sided migraine over her right eyebrow and took 800 mg ibuprofen this morning at about 5 AM. She did not have any resolution of her migraine. She did not notice any loss of vision but her  did notice that yesterday she seemed to be running into things on her right side as well.  Otherwise no problems with speech.  Today she does feel like she is having some more problems with concentration.  Her typical migraine is right-sided and if it gets bad it will be accompanied by nausea but she has never had visual issues with her migraines previously.  She does wear glasses but has not noticed a loss of vision in her right eye.    LABS  Pertinent lab results reviewed in chart.  Results for orders placed or performed during the hospital encounter of 12/22/21   MR Brain w/o & w Contrast    Impression    IMPRESSION:  HEAD MRI:   1.  Patchy areas of restricted diffusion are seen within the medial aspect of the left cerebral hemisphere demonstrating an MCA MAYO watershed type distribution. No finding for hemorrhagic transformation.    2.  Mild paranasal sinus mucosal thickening with right maxillary sinus dependent air-fluid level compatible with active sinus disease.    HEAD MRA:   1.  Exam is degraded by motion  artifact.    2.  Short segment severe stenosis M1 segment of the left MCA.    3.  Allowing for artifact, more distal middle and anterior cerebral arteries are patent.    NECK MRA:  1.  No significant stenosis either cervical carotid or vertebral system. No findings for dissection.    Findings called to Dr. Georges at 4:52 PM.   MRA Brain ([place]) wo Contrast    Impression    IMPRESSION:  HEAD MRI:   1.  Patchy areas of restricted diffusion are seen within the medial aspect of the left cerebral hemisphere demonstrating an MCA MAYO watershed type distribution. No finding for hemorrhagic transformation.    2.  Mild paranasal sinus mucosal thickening with right maxillary sinus dependent air-fluid level compatible with active sinus disease.    HEAD MRA:   1.  Exam is degraded by motion artifact.    2.  Short segment severe stenosis M1 segment of the left MCA.    3.  Allowing for artifact, more distal middle and anterior cerebral arteries are patent.    NECK MRA:  1.  No significant stenosis either cervical carotid or vertebral system. No findings for dissection.    Findings called to Dr. Georges at 4:52 PM.   MRA Neck (Carotids) wo & w Contrast    Impression    IMPRESSION:  HEAD MRI:   1.  Patchy areas of restricted diffusion are seen within the medial aspect of the left cerebral hemisphere demonstrating an MCA MAYO watershed type distribution. No finding for hemorrhagic transformation.    2.  Mild paranasal sinus mucosal thickening with right maxillary sinus dependent air-fluid level compatible with active sinus disease.    HEAD MRA:   1.  Exam is degraded by motion artifact.    2.  Short segment severe stenosis M1 segment of the left MCA.    3.  Allowing for artifact, more distal middle and anterior cerebral arteries are patent.    NECK MRA:  1.  No significant stenosis either cervical carotid or vertebral system. No findings for dissection.    Findings called to Dr. Georges at 4:52 PM.   Basic metabolic  panel   Result Value Ref Range    Sodium 139 136 - 145 mmol/L    Potassium 4.3 3.5 - 5.0 mmol/L    Chloride 106 98 - 107 mmol/L    Carbon Dioxide (CO2) 19 (L) 22 - 31 mmol/L    Anion Gap 14 5 - 18 mmol/L    Urea Nitrogen 11 8 - 22 mg/dL    Creatinine 0.63 0.60 - 1.10 mg/dL    Calcium 9.5 8.5 - 10.5 mg/dL    Glucose 95 70 - 125 mg/dL    GFR Estimate >90 >60 mL/min/1.73m2   Result Value Ref Range    INR 0.95 0.85 - 1.15   Partial thromboplastin time   Result Value Ref Range    aPTT 31 22 - 38 Seconds   CBC with platelets and differential   Result Value Ref Range    WBC Count 7.7 4.0 - 11.0 10e3/uL    RBC Count 4.69 3.80 - 5.20 10e6/uL    Hemoglobin 13.8 11.7 - 15.7 g/dL    Hematocrit 41.3 35.0 - 47.0 %    MCV 88 78 - 100 fL    MCH 29.4 26.5 - 33.0 pg    MCHC 33.4 31.5 - 36.5 g/dL    RDW 11.9 10.0 - 15.0 %    Platelet Count 183 150 - 450 10e3/uL    % Neutrophils 55 %    % Lymphocytes 36 %    % Monocytes 5 %    % Eosinophils 3 %    % Basophils 1 %    % Immature Granulocytes 0 %    NRBCs per 100 WBC 0 <1 /100    Absolute Neutrophils 4.2 1.6 - 8.3 10e3/uL    Absolute Lymphocytes 2.8 0.8 - 5.3 10e3/uL    Absolute Monocytes 0.4 0.0 - 1.3 10e3/uL    Absolute Eosinophils 0.2 0.0 - 0.7 10e3/uL    Absolute Basophils 0.0 0.0 - 0.2 10e3/uL    Absolute Immature Granulocytes 0.0 <=0.4 10e3/uL    Absolute NRBCs 0.0 10e3/uL   Extra Green Top (Lithium Heparin) Tube   Result Value Ref Range    Hold Specimen JI    Asymptomatic COVID-19 Virus (Coronavirus) by PCR Nasopharyngeal    Specimen: Nasopharyngeal; Swab   Result Value Ref Range    SARS CoV2 PCR Negative Negative   Lipid panel reflex to direct LDL   Result Value Ref Range    Cholesterol 219 (H) <=199 mg/dL    Triglycerides 112 <=149 mg/dL    Direct Measure HDL 46 (L) >=50 mg/dL    LDL Cholesterol Calculated 151 (H) <=129 mg/dL   Result Value Ref Range    Hemoglobin A1C 5.3 <=5.6 %   Erythrocyte sedimentation rate auto   Result Value Ref Range    Erythrocyte Sedimentation Rate 12  0 - 20 mm/hr   ECG 12-LEAD WITH MUSE (L[facility])   Result Value Ref Range    Systolic Blood Pressure  mmHg    Diastolic Blood Pressure  mmHg    Ventricular Rate 59 BPM    Atrial Rate 59 BPM    CO Interval 156 ms    QRS Duration 92 ms     ms    QTc 405 ms    P Axis 50 degrees    R AXIS 33 degrees    T Axis 31 degrees    Interpretation ECG       Sinus bradycardia with sinus arrhythmia  Cannot rule out Anterior infarct , age undetermined  Abnormal ECG  No previous ECGs available  Confirmed by SEE ED PROVIDER NOTE FOR, ECG INTERPRETATION ([id number]),  Khadijah Spence ([id number]) on 12/22/2021 12:06:58 PM         EKG  ***    RADIOLOGY  [unfilled]    PROCEDURES   ***    ED COURSE & MEDICAL DECISION MAKING      1:50 AM Patient signed out to me by Dr. Mena. Patient is awaiting neuro tele bed placement.    Pertinent Labs and Imagaing reviewed (see chart for details)    36 year old female ***    At the conclusion of the encounter I discussed  the results of all of the tests and the disposition.   The questions were answered.  The patient or family acknowledged understanding and was agreeable with the care plan.       FINAL IMPRESSION        1. Acute CVA (cerebrovascular accident) (H)          CRITICAL CARE    ***      I, Kin Molina, am serving as a scribe to document services personally performed by Jacinto Kam M.D. based on my observations and the provider's statements to me. I, Jacinto Kam M.D., attest that Kin Molina is acting in a scribe capacity, has observed my performance of the services and has documented them in accordance with my direction.      Jacinto Kam M.D.  [facility] EMERGENCY ROOM  [address] [facility] [address] [phone]  Dept: [phone]

## 2021-12-23 NOTE — PHARMACY-ADMISSION MEDICATION HISTORY
Pharmacy Note - Admission Medication History    Pertinent Provider Information: none     ______________________________________________________________________    Prior To Admission (PTA) med list completed and updated in EMR.       PTA Med List   Medication Sig Last Dose     albuterol (PROAIR HFA/PROVENTIL HFA/VENTOLIN HFA) 108 (90 Base) MCG/ACT inhaler Inhale 2 puffs into the lungs every 6 hours as needed for shortness of breath / dyspnea or wheezing Past Month at Unknown time     naproxen (NAPROSYN) 500 MG tablet Take 500 mg by mouth 2 times daily as needed for moderate pain Past Month at Unknown time       New med list    Info from patient in person,  Has no home medication with her    The information provided in this note is only as accurate as the sources available at the time of the update(s).    Thank you for the opportunity to participate in the care of this patient.    Jeronimo Guzman [facility]  12/23/2021 8:03 AM

## 2021-12-23 NOTE — CONSULTS
NEUROLOGY INPATIENT CONSULTATION NOTE       [facility]  [address] [place] [address]  Tel: [phone]  Fax: [phone]  [email or web address]     Samia Ordoñez,  1985, MRN [id number]  PCP: Sunny Chin  Date: 2021     ASSESSMENT & PLAN     Diagnosis code: Watershed infarction    Left MCA-MAYO watershed infarction; left M1 stenosis  36-year-old previously healthy female with history of miscarriage admitted with 2 days history of right hand numbness along with visual symptoms.  On exam she has right homonymous hemianopsia.  With her history of miscarriage, antiphospholipid antibody syndrome is a possibility    MRI of the head and MRA shows watershed infarct in the left MCA-MAYO territory with a short segment left M1 stenosis    Dual antiplatelet therapy with aspirin and Brilinta for 30 days.  Afterwards she can be switched to aspirin monotherapy    LDL is elevated at 151, increase Lipitor dose to 40 mg daily with goal of LDL less than 70    Echocardiogram shows normal ejection fraction with no significant valvular heart abnormality, cardiac source of emboli or right-to-left shunt    Hypercoagulable work-up to include antiphospholipid panel, activated protein C resistance, factor V Leiden mutation, MTHFR gene mutation, Antithrombin III, lactic acid, pyruvic acid, RPR, homocystine, antinuclear antibody    IV hydration with normal saline    Telemetry monitoring and if no cardiac arrhythmias detected, schedule 30-day event monitor    Physical, occupational, speech and pharmacy consult    Stroke education was provided    Thank you again for this referral, please feel free to contact me if you have any questions.    Gaurav Mohamud MD  [facility]  (Former[facility], [facility]     CHIEF COMPLAINT Cerebral infarction, watershed distribution, unilateral, acute (H)     HISTORY OF PRESENT ILLNESS     We have  been requested by Dr. Brock to evaluate Samia Ordoñez who is a 36 year old  female for CVA    Patient is a 36-year-old previously healthy female who presented to the emergency room with complaint of numbness and weakness in the right fingertips that started Tuesday morning.  According to patient along with right arm numbness she had some visual symptoms and realized she could not see clearly through her right side but did not think much about it and went to work.  She took Wednesday of and as her symptoms are not getting better she decided to come to the emergency room.  She denies any weakness in her right arm, facial droop, speech difficulty, loss of consciousness.  She subsequently had MRI of the brain that showed patchy areas of restricted diffusion in a watershed distribution on MCA and MAYO distribution additionally patient had short segment severe stenosis of the left M1.  No significant stenosis noted in internal carotids.  Echocardiogram with bubble study was done that was negative for right-to-left shunt with no evidence of cardiac source of emboli.      Patient denies any headaches, history of autoimmune disorders but does admit she had a miscarriage and her one child was born premature.  There is no family history of CVA or MI at a relatively young age.  She is also not taking any medication or birth control pills     PROBLEM LIST      Patient Active Problem List   Diagnosis Code     Pain in joint, pelvic region and thigh M25.559     Left MCA-MAYO watershed infarction I63.89     Left M1 stenosis I67.2     Hyperlipidemia LDL goal <70 E78.5      Clinically Significant Risk Factors Present on Admission                   PAST MEDICAL & SURGICAL HISTORY     Past Medical History: Patient  has no past medical history on file.    Past Surgical History: She  has no past surgical history on file.     SOCIAL HISTORY     Reviewed, and she  reports that she has quit smoking. She does not have any smokeless tobacco  history on file.     FAMILY HISTORY     Reviewed, and family history includes Cerebrovascular Disease in her father.     ALLERGIES     Allergies   Allergen Reactions     Sumatriptan      Imitrex      Adhesive Tape Rash     Sulfamethoxazole-Trimethoprim Rash        REVIEW OF SYSTEMS     Pertinent items are noted in HPI.     HOME & HOSPITAL MEDICATIONS     Prior to Admission Medications  (Not in a hospital admission)      Hospital Medications    aspirin  81 mg Oral Daily     [START ON 12/24/2021] atorvastatin  40 mg Oral Daily     ticagrelor  90 mg Oral BID        PHYSICAL EXAM     Vital signs  Pulse:  [] 82  Resp:  [9-24] 23  BP: (131-160)/(77-99) 158/92  SpO2:  [91 %-97 %] 95 %    Weight:   Wt Readings from Last 1 Encounters:   12/22/21 79.4 kg (175 lb)        General Physical Exam: Patient is alert and oriented x 3. Vital signs were reviewed and are documented in EMR. Neck was supple, no carotid bruit, thyromegaly, JVD or lymphadenopathy noted.  Neurological Exam:  Mental status: Alert and oriented x3 no acute distress  Speech: Normal with no dysarthria or aphasia.  She can name objects, parts of objects.  Written and verbal comprehension is intact.  She can repeat sentences  Cranial nerves: Patient has right homonymous hemianopsia rest of her cranial nerves are intact  Motor: Normal mass and tone in all muscle groups.  Strength is 5/5 with no pronator drift  Reflexes: 2+ on the left 1+ on the right with upgoing toe on the right and downgoing toe on the left  Sensory: Intact light touch pinprick.  No double simultaneous extinction.  Graphesthesia is normal  Coordination: Minimal dysmetria on right finger-nose testing no past-pointing.  Normal on the left  Gait: Deferred for safety     DIAGNOSTIC STUDIES     Pertinent Radiology   Radiology Results: Reviewed impression and images     MRI  HEAD MRI:   1.  Patchy areas of restricted diffusion are seen within the medial aspect of the left cerebral hemisphere  demonstrating an MCA MAYO watershed type distribution. No finding for hemorrhagic transformation.     2.  Mild paranasal sinus mucosal thickening with right maxillary sinus dependent air-fluid level compatible with active sinus disease.     HEAD MRA:   1.  Exam is degraded by motion artifact.     2.  Short segment severe stenosis M1 segment of the left MCA.     3.  Allowing for artifact, more distal middle and anterior cerebral arteries are patent.     NECK MRA:  1.  No significant stenosis either cervical carotid or vertebral system. No findings for dissection.    Echocardiogram  Left ventricular size, wall motion and function are normal. The ejection  fraction is 60-65%.  Normal right ventricle size and systolic function.  A contrast injection (Bubble Study) was performed that was negative for flow  across the interatrial septum.  No evidence of left ventricular mass or tumors  No hemodynamically significant valvular abnormalities on 2D or color flow  imaging.  Pertinent Labs   Lab Results: Personally Reviewed   Recent Results (from the past 24 hour(s))   Erythrocyte sedimentation rate auto    Collection Time: 12/22/21  9:22 PM   Result Value Ref Range    Erythrocyte Sedimentation Rate 12 0 - 20 mm/hr   Echocardiogram Complete    Collection Time: 12/23/21 11:00 AM   Result Value Ref Range    LVEF  60-65%        Total time spent for face to face visit, reviewing labs/imaging studies, counseling and coordination of care was: 1 Hour 15 Minutes More than 50% of this time was spent on counseling and coordination of care.      This note was dictated using voice recognition software.  Any grammatical or context distortions are unintentional and inherent to the software.

## 2021-12-23 NOTE — ED PROVIDER NOTES
"  ED SIGNOUT  Date/Time:12/23/2021 1:08 PM    Patient signed out to me by my colleague, Dr. Kam.  Please see their note for complete history and physical. Plan to call Neurology to double check if other labs or imaging is needed for the work up while waiting for admission.    The creation of this record is based on the scribe s observations of the work being performed by Dr. Ramos and the provider s statements to them. It was created on their behalf by Silvia Jara a trained medical scribe. This document has been checked and approved by the attending provider.      REMAINING ED WORKUP:    Vitals:  BP (!) 154/86   Pulse 78   Temp 98  F (36.7  C) (Oral)   Resp 14   Ht 1.626 m (5' 4\")   Wt 79.4 kg (175 lb)   LMP 12/07/2021   SpO2 94%   BMI 30.04 kg/m        Pertinent labs results reviewed   Results for orders placed or performed during the hospital encounter of 12/22/21   MR Brain w/o & w Contrast    Impression    IMPRESSION:  HEAD MRI:   1.  Patchy areas of restricted diffusion are seen within the medial aspect of the left cerebral hemisphere demonstrating an MCA MAYO watershed type distribution. No finding for hemorrhagic transformation.    2.  Mild paranasal sinus mucosal thickening with right maxillary sinus dependent air-fluid level compatible with active sinus disease.    HEAD MRA:   1.  Exam is degraded by motion artifact.    2.  Short segment severe stenosis M1 segment of the left MCA.    3.  Allowing for artifact, more distal middle and anterior cerebral arteries are patent.    NECK MRA:  1.  No significant stenosis either cervical carotid or vertebral system. No findings for dissection.    Findings called to Dr. Georges at 4:52 PM.   MRA Brain ([place]) wo Contrast    Impression    IMPRESSION:  HEAD MRI:   1.  Patchy areas of restricted diffusion are seen within the medial aspect of the left cerebral hemisphere demonstrating an MCA MAYO watershed type distribution. No finding for " hemorrhagic transformation.    2.  Mild paranasal sinus mucosal thickening with right maxillary sinus dependent air-fluid level compatible with active sinus disease.    HEAD MRA:   1.  Exam is degraded by motion artifact.    2.  Short segment severe stenosis M1 segment of the left MCA.    3.  Allowing for artifact, more distal middle and anterior cerebral arteries are patent.    NECK MRA:  1.  No significant stenosis either cervical carotid or vertebral system. No findings for dissection.    Findings called to Dr. Georges at 4:52 PM.   MRA Neck (Carotids) wo & w Contrast    Impression    IMPRESSION:  HEAD MRI:   1.  Patchy areas of restricted diffusion are seen within the medial aspect of the left cerebral hemisphere demonstrating an MCA MAYO watershed type distribution. No finding for hemorrhagic transformation.    2.  Mild paranasal sinus mucosal thickening with right maxillary sinus dependent air-fluid level compatible with active sinus disease.    HEAD MRA:   1.  Exam is degraded by motion artifact.    2.  Short segment severe stenosis M1 segment of the left MCA.    3.  Allowing for artifact, more distal middle and anterior cerebral arteries are patent.    NECK MRA:  1.  No significant stenosis either cervical carotid or vertebral system. No findings for dissection.    Findings called to Dr. Georges at 4:52 PM.   Basic metabolic panel   Result Value Ref Range    Sodium 139 136 - 145 mmol/L    Potassium 4.3 3.5 - 5.0 mmol/L    Chloride 106 98 - 107 mmol/L    Carbon Dioxide (CO2) 19 (L) 22 - 31 mmol/L    Anion Gap 14 5 - 18 mmol/L    Urea Nitrogen 11 8 - 22 mg/dL    Creatinine 0.63 0.60 - 1.10 mg/dL    Calcium 9.5 8.5 - 10.5 mg/dL    Glucose 95 70 - 125 mg/dL    GFR Estimate >90 >60 mL/min/1.73m2   Result Value Ref Range    INR 0.95 0.85 - 1.15   Partial thromboplastin time   Result Value Ref Range    aPTT 31 22 - 38 Seconds   CBC with platelets and differential   Result Value Ref Range    WBC Count 7.7 4.0 -  11.0 10e3/uL    RBC Count 4.69 3.80 - 5.20 10e6/uL    Hemoglobin 13.8 11.7 - 15.7 g/dL    Hematocrit 41.3 35.0 - 47.0 %    MCV 88 78 - 100 fL    MCH 29.4 26.5 - 33.0 pg    MCHC 33.4 31.5 - 36.5 g/dL    RDW 11.9 10.0 - 15.0 %    Platelet Count 183 150 - 450 10e3/uL    % Neutrophils 55 %    % Lymphocytes 36 %    % Monocytes 5 %    % Eosinophils 3 %    % Basophils 1 %    % Immature Granulocytes 0 %    NRBCs per 100 WBC 0 <1 /100    Absolute Neutrophils 4.2 1.6 - 8.3 10e3/uL    Absolute Lymphocytes 2.8 0.8 - 5.3 10e3/uL    Absolute Monocytes 0.4 0.0 - 1.3 10e3/uL    Absolute Eosinophils 0.2 0.0 - 0.7 10e3/uL    Absolute Basophils 0.0 0.0 - 0.2 10e3/uL    Absolute Immature Granulocytes 0.0 <=0.4 10e3/uL    Absolute NRBCs 0.0 10e3/uL   Extra Green Top (Lithium Heparin) Tube   Result Value Ref Range    Hold Specimen JI    Asymptomatic COVID-19 Virus (Coronavirus) by PCR Nasopharyngeal    Specimen: Nasopharyngeal; Swab   Result Value Ref Range    SARS CoV2 PCR Negative Negative   Lipid panel reflex to direct LDL   Result Value Ref Range    Cholesterol 219 (H) <=199 mg/dL    Triglycerides 112 <=149 mg/dL    Direct Measure HDL 46 (L) >=50 mg/dL    LDL Cholesterol Calculated 151 (H) <=129 mg/dL   Result Value Ref Range    Hemoglobin A1C 5.3 <=5.6 %   Erythrocyte sedimentation rate auto   Result Value Ref Range    Erythrocyte Sedimentation Rate 12 0 - 20 mm/hr   ECG 12-LEAD WITH MUSE (LHE)   Result Value Ref Range    Systolic Blood Pressure  mmHg    Diastolic Blood Pressure  mmHg    Ventricular Rate 59 BPM    Atrial Rate 59 BPM    RI Interval 156 ms    QRS Duration 92 ms     ms    QTc 405 ms    P Axis 50 degrees    R AXIS 33 degrees    T Axis 31 degrees    Interpretation ECG       Sinus bradycardia with sinus arrhythmia  Cannot rule out Anterior infarct , age undetermined  Abnormal ECG  No previous ECGs available  Confirmed by SEE ED PROVIDER NOTE FOR, ECG INTERPRETATION ([id number]),  Khadijah Spence ([id number]) on 12/22/2021  12:06:58 PM     Echocardiogram Complete   Result Value Ref Range    LVEF  60-65%        Pertinent imaging reviewed   Please see official radiology report.  Echocardiogram Complete   Final Result      MRA Neck (Carotids) wo & w Contrast   Final Result   IMPRESSION:   HEAD MRI:    1.  Patchy areas of restricted diffusion are seen within the medial aspect of the left cerebral hemisphere demonstrating an MCA MAYO watershed type distribution. No finding for hemorrhagic transformation.      2.  Mild paranasal sinus mucosal thickening with right maxillary sinus dependent air-fluid level compatible with active sinus disease.      HEAD MRA:    1.  Exam is degraded by motion artifact.      2.  Short segment severe stenosis M1 segment of the left MCA.      3.  Allowing for artifact, more distal middle and anterior cerebral arteries are patent.      NECK MRA:   1.  No significant stenosis either cervical carotid or vertebral system. No findings for dissection.      Findings called to Dr. Georges at 4:52 PM.      MRA Brain ([place]) wo Contrast   Final Result   IMPRESSION:   HEAD MRI:    1.  Patchy areas of restricted diffusion are seen within the medial aspect of the left cerebral hemisphere demonstrating an MCA MAYO watershed type distribution. No finding for hemorrhagic transformation.      2.  Mild paranasal sinus mucosal thickening with right maxillary sinus dependent air-fluid level compatible with active sinus disease.      HEAD MRA:    1.  Exam is degraded by motion artifact.      2.  Short segment severe stenosis M1 segment of the left MCA.      3.  Allowing for artifact, more distal middle and anterior cerebral arteries are patent.      NECK MRA:   1.  No significant stenosis either cervical carotid or vertebral system. No findings for dissection.      Findings called to Dr. Georges at 4:52 PM.      MR Brain w/o & w Contrast   Final Result   IMPRESSION:   HEAD MRI:    1.  Patchy areas of restricted diffusion  are seen within the medial aspect of the left cerebral hemisphere demonstrating an MCA MAYO watershed type distribution. No finding for hemorrhagic transformation.      2.  Mild paranasal sinus mucosal thickening with right maxillary sinus dependent air-fluid level compatible with active sinus disease.      HEAD MRA:    1.  Exam is degraded by motion artifact.      2.  Short segment severe stenosis M1 segment of the left MCA.      3.  Allowing for artifact, more distal middle and anterior cerebral arteries are patent.      NECK MRA:   1.  No significant stenosis either cervical carotid or vertebral system. No findings for dissection.      Findings called to Dr. Georges at 4:52 PM.           Interventions  Medications   LORazepam (ATIVAN) injection 1 mg (1 mg Intravenous Given 12/22/21 1428)   0.9% sodium chloride BOLUS (0 mLs Intravenous Stopped 12/22/21 1900)     Followed by   sodium chloride 0.9% infusion (125 mL/hr Intravenous Rate/Dose Verify 12/23/21 1149)   aspirin (ASA) tablet 325 mg (325 mg Oral Given 12/23/21 0814)   atorvastatin (LIPITOR) tablet 20 mg (20 mg Oral Given 12/23/21 1144)   ondansetron (ZOFRAN) injection 8 mg (8 mg Intravenous Given 12/22/21 1128)   acetaminophen (TYLENOL) tablet 975 mg (975 mg Oral Given 12/22/21 1128)   gadobutrol (GADAVIST) injection 8 mL (8 mLs Intravenous Given 12/22/21 1615)   HYDROmorphone (PF) (DILAUDID) injection 1 mg (1 mg Intravenous Given 12/22/21 1719)   aspirin (ASA) chewable tablet 162 mg (162 mg Oral Given 12/22/21 1718)   ondansetron (ZOFRAN) injection 4 mg (4 mg Intravenous Given 12/22/21 1901)   acetaminophen (TYLENOL) tablet 650 mg (650 mg Oral Given 12/22/21 2248)   ibuprofen (ADVIL/MOTRIN) tablet 600 mg (600 mg Oral Given 12/22/21 2248)   fentaNYL (PF) (SUBLIMAZE) injection 50 mcg (50 mcg Intravenous Given 12/23/21 0043)   fentaNYL (PF) (SUBLIMAZE) injection 50 mcg (50 mcg Intravenous Given 12/23/21 0815)   fentaNYL (PF) (SUBLIMAZE) injection 50 mcg (50  mcg Intravenous Given 12/23/21 1210)        ED Course/MDM:  6:00 AM Signout accepted from Dr. Kam.  Prior records were reviewed.  Diagnostics from this visit are reviewed.  8:55 AM  Dr. Mohamud, recommends aspirin 81 mg daily, plavix 75 mg daily, lipitor 20 mg daily, IV normal saline 75 mL/hr  12:15 PM Echocardiogram done in the ED. Dr. Mohamud will plan to come evaluate the patient after his clinic day. Patient already received aspirin 325 mg which was ordered daily this morning at 8 am. Will hold on changing to aspirin 81 mg and plavix 75 mg until seen by Dr. Mohamud. Patient already on  mL/hr.     ED Course as of 12/23/21 1311   Wed Dec 22, 2021   1206 Onset of symptoms was yesterday morning at 7 AM so no stroke code was called.  Certainly stroke is a possibility with her right hand numbness and associated vision changes.  However, her vision change suggest that this is a peripheral issue either optic nerve or posterior eye.  I am having her speak with her eye doctor while we are evaluating her brain here to see if they are able to get an appointment in the next couple of days to go in for a full eye check for that.  Differential certainly includes retinal hemorrhage, partial retinal detachment, or optic nerve abnormality/venous congestion but at this time she has no pain in that right eye and did not notice herself the loss of vision so though according to her  she has been newly running into things on her right side.  As far as the right hand numbness although she states she has had evaluation done already and they have not found any signs of carpal tunnel this may now be the development of carpal tunnel syndrome especially as she has been doing more exercises of her hands over the course of the past few months.  Certainly otherwise may be a more diffuse patchy abnormality of the brain such as demyelinating disease, mass, or emboli.  The right hand may also be a focal neuropathy from pressure or  position as she is typing doing her daily work.  The difficulty concentrating may be secondary to her recurring migraine.  Less likely this is hemorrhage but obviously her blood pressure was up when she arrived so we will find out when we do her imaging.  She is stable at this time.   1209 Ekg is unremarkable.   1422 Unfortunately the patient had marked on her MRI checklist that she was claustrophobic. Nursing staff had not had a chance to let me know prior to her going over and she did have a panic attack. She is now recovering from that but still feeling anxious and so we are going to give her medication now for anxiety and then she is willing to try again to get the MRI done.           1. Acute CVA (cerebrovascular accident) (H)          Prachi Ramos MD  [facility] Emergency Department          Prachi Ramos MD  12/23/21 [id number]

## 2021-12-24 ENCOUNTER — APPOINTMENT (OUTPATIENT)
Dept: OCCUPATIONAL THERAPY | Facility: CLINIC | Age: 36
DRG: 065 | End: 2021-12-24
Attending: PSYCHIATRY & NEUROLOGY
Payer: COMMERCIAL

## 2021-12-24 ENCOUNTER — APPOINTMENT (OUTPATIENT)
Dept: CT IMAGING | Facility: CLINIC | Age: 36
DRG: 065 | End: 2021-12-24
Attending: STUDENT IN AN ORGANIZED HEALTH CARE EDUCATION/TRAINING PROGRAM
Payer: COMMERCIAL

## 2021-12-24 VITALS
RESPIRATION RATE: 16 BRPM | OXYGEN SATURATION: 97 % | SYSTOLIC BLOOD PRESSURE: 168 MMHG | TEMPERATURE: 97.8 F | HEART RATE: 81 BPM | DIASTOLIC BLOOD PRESSURE: 114 MMHG

## 2021-12-24 PROBLEM — I63.9 ACUTE ISCHEMIC STROKE (H): Status: ACTIVE | Noted: 2021-12-24

## 2021-12-24 LAB
ANA SER QL IF: NEGATIVE
ANION GAP SERPL CALCULATED.3IONS-SCNC: 9 MMOL/L (ref 3–14)
BUN SERPL-MCNC: 6 MG/DL (ref 7–30)
CALCIUM SERPL-MCNC: 8.6 MG/DL (ref 8.5–10.1)
CHLORIDE BLD-SCNC: 110 MMOL/L (ref 94–109)
CO2 SERPL-SCNC: 22 MMOL/L (ref 20–32)
CREAT SERPL-MCNC: 0.49 MG/DL (ref 0.52–1.04)
CREAT SERPL-MCNC: 0.56 MG/DL (ref 0.52–1.04)
ERYTHROCYTE [DISTWIDTH] IN BLOOD BY AUTOMATED COUNT: 11.9 % (ref 10–15)
GFR SERPL CREATININE-BSD FRML MDRD: >90 ML/MIN/1.73M2
GFR SERPL CREATININE-BSD FRML MDRD: >90 ML/MIN/1.73M2
GLUCOSE BLD-MCNC: 98 MG/DL (ref 70–99)
GLUCOSE BLDC GLUCOMTR-MCNC: 87 MG/DL (ref 70–99)
GLUCOSE BLDC GLUCOMTR-MCNC: 88 MG/DL (ref 70–99)
HCT VFR BLD AUTO: 36.9 % (ref 35–47)
HGB BLD-MCNC: 12.4 G/DL (ref 11.7–15.7)
MCH RBC QN AUTO: 29.7 PG (ref 26.5–33)
MCHC RBC AUTO-ENTMCNC: 33.6 G/DL (ref 31.5–36.5)
MCV RBC AUTO: 89 FL (ref 78–100)
PLATELET # BLD AUTO: 158 10E3/UL (ref 150–450)
POTASSIUM BLD-SCNC: 3.8 MMOL/L (ref 3.4–5.3)
RBC # BLD AUTO: 4.17 10E6/UL (ref 3.8–5.2)
SODIUM SERPL-SCNC: 141 MMOL/L (ref 133–144)
WBC # BLD AUTO: 6.7 10E3/UL (ref 4–11)

## 2021-12-24 PROCEDURE — 70496 CT ANGIOGRAPHY HEAD: CPT

## 2021-12-24 PROCEDURE — 85027 COMPLETE CBC AUTOMATED: CPT | Performed by: STUDENT IN AN ORGANIZED HEALTH CARE EDUCATION/TRAINING PROGRAM

## 2021-12-24 PROCEDURE — 70450 CT HEAD/BRAIN W/O DYE: CPT | Mod: 26 | Performed by: RADIOLOGY

## 2021-12-24 PROCEDURE — 36415 COLL VENOUS BLD VENIPUNCTURE: CPT | Performed by: PSYCHIATRY & NEUROLOGY

## 2021-12-24 PROCEDURE — 258N000003 HC RX IP 258 OP 636: Performed by: STUDENT IN AN ORGANIZED HEALTH CARE EDUCATION/TRAINING PROGRAM

## 2021-12-24 PROCEDURE — 80048 BASIC METABOLIC PNL TOTAL CA: CPT | Performed by: STUDENT IN AN ORGANIZED HEALTH CARE EDUCATION/TRAINING PROGRAM

## 2021-12-24 PROCEDURE — 97165 OT EVAL LOW COMPLEX 30 MIN: CPT | Mod: GO

## 2021-12-24 PROCEDURE — 250N000013 HC RX MED GY IP 250 OP 250 PS 637: Performed by: STUDENT IN AN ORGANIZED HEALTH CARE EDUCATION/TRAINING PROGRAM

## 2021-12-24 PROCEDURE — 99223 1ST HOSP IP/OBS HIGH 75: CPT | Mod: GC | Performed by: PSYCHIATRY & NEUROLOGY

## 2021-12-24 PROCEDURE — 82565 ASSAY OF CREATININE: CPT | Performed by: STUDENT IN AN ORGANIZED HEALTH CARE EDUCATION/TRAINING PROGRAM

## 2021-12-24 PROCEDURE — 120N000002 HC R&B MED SURG/OB UMMC

## 2021-12-24 PROCEDURE — 250N000011 HC RX IP 250 OP 636: Performed by: STUDENT IN AN ORGANIZED HEALTH CARE EDUCATION/TRAINING PROGRAM

## 2021-12-24 PROCEDURE — 70450 CT HEAD/BRAIN W/O DYE: CPT

## 2021-12-24 PROCEDURE — 97530 THERAPEUTIC ACTIVITIES: CPT | Mod: GO

## 2021-12-24 PROCEDURE — 999N000128 HC STATISTIC PERIPHERAL IV START W/O US GUIDANCE

## 2021-12-24 PROCEDURE — 85390 FIBRINOLYSINS SCREEN I&R: CPT | Mod: 26 | Performed by: PATHOLOGY

## 2021-12-24 PROCEDURE — 97535 SELF CARE MNGMENT TRAINING: CPT | Mod: GO

## 2021-12-24 PROCEDURE — 250N000011 HC RX IP 250 OP 636

## 2021-12-24 PROCEDURE — 0042T CT HEAD PERFUSION WITH CONTRAST: CPT

## 2021-12-24 PROCEDURE — 70498 CT ANGIOGRAPHY NECK: CPT

## 2021-12-24 PROCEDURE — 70496 CT ANGIOGRAPHY HEAD: CPT | Mod: 26 | Performed by: RADIOLOGY

## 2021-12-24 PROCEDURE — 36415 COLL VENOUS BLD VENIPUNCTURE: CPT | Performed by: STUDENT IN AN ORGANIZED HEALTH CARE EDUCATION/TRAINING PROGRAM

## 2021-12-24 PROCEDURE — 70498 CT ANGIOGRAPHY NECK: CPT | Mod: 26 | Performed by: RADIOLOGY

## 2021-12-24 PROCEDURE — 85730 THROMBOPLASTIN TIME PARTIAL: CPT | Performed by: PSYCHIATRY & NEUROLOGY

## 2021-12-24 PROCEDURE — 0042T CT HEAD PERFUSION WITH CONTRAST: CPT | Mod: GC | Performed by: RADIOLOGY

## 2021-12-24 PROCEDURE — 82232 ASSAY OF BETA-2 PROTEIN: CPT | Performed by: PSYCHIATRY & NEUROLOGY

## 2021-12-24 RX ORDER — KETOROLAC TROMETHAMINE 30 MG/ML
15 INJECTION, SOLUTION INTRAMUSCULAR; INTRAVENOUS ONCE
Status: COMPLETED | OUTPATIENT
Start: 2021-12-24 | End: 2021-12-24

## 2021-12-24 RX ORDER — ASPIRIN 325 MG
325 TABLET ORAL DAILY
Qty: 60 TABLET | Refills: 3 | Status: SHIPPED | OUTPATIENT
Start: 2022-01-15 | End: 2022-04-12 | Stop reason: DRUGHIGH

## 2021-12-24 RX ORDER — ASPIRIN 81 MG/1
81 TABLET, CHEWABLE ORAL DAILY
Qty: 20 TABLET | Refills: 0 | Status: SHIPPED | OUTPATIENT
Start: 2021-12-25 | End: 2022-01-14

## 2021-12-24 RX ORDER — LIDOCAINE 40 MG/G
CREAM TOPICAL
Status: DISCONTINUED | OUTPATIENT
Start: 2021-12-24 | End: 2021-12-24 | Stop reason: HOSPADM

## 2021-12-24 RX ORDER — ATORVASTATIN CALCIUM 40 MG/1
40 TABLET, FILM COATED ORAL EVERY EVENING
Status: DISCONTINUED | OUTPATIENT
Start: 2021-12-24 | End: 2021-12-24 | Stop reason: HOSPADM

## 2021-12-24 RX ORDER — ATORVASTATIN CALCIUM 40 MG/1
40 TABLET, FILM COATED ORAL EVERY EVENING
Qty: 30 TABLET | Refills: 3 | Status: SHIPPED | OUTPATIENT
Start: 2021-12-24 | End: 2022-04-12

## 2021-12-24 RX ORDER — SODIUM CHLORIDE 9 MG/ML
INJECTION, SOLUTION INTRAVENOUS CONTINUOUS PRN
Status: ACTIVE | OUTPATIENT
Start: 2021-12-24 | End: 2021-12-24

## 2021-12-24 RX ORDER — ASPIRIN 81 MG/1
81 TABLET, CHEWABLE ORAL DAILY
Status: DISCONTINUED | OUTPATIENT
Start: 2021-12-24 | End: 2021-12-24 | Stop reason: HOSPADM

## 2021-12-24 RX ORDER — OLANZAPINE 10 MG/2ML
5 INJECTION, POWDER, FOR SOLUTION INTRAMUSCULAR ONCE
Status: COMPLETED | OUTPATIENT
Start: 2021-12-24 | End: 2021-12-24

## 2021-12-24 RX ORDER — IOPAMIDOL 755 MG/ML
115 INJECTION, SOLUTION INTRAVASCULAR ONCE
Status: COMPLETED | OUTPATIENT
Start: 2021-12-24 | End: 2021-12-24

## 2021-12-24 RX ADMIN — IOPAMIDOL 115 ML: 755 INJECTION, SOLUTION INTRAVENOUS at 10:52

## 2021-12-24 RX ADMIN — SODIUM CHLORIDE: 9 INJECTION, SOLUTION INTRAVENOUS at 02:14

## 2021-12-24 RX ADMIN — KETOROLAC TROMETHAMINE 15 MG: 30 INJECTION, SOLUTION INTRAMUSCULAR; INTRAVENOUS at 02:07

## 2021-12-24 RX ADMIN — TICAGRELOR 90 MG: 90 TABLET ORAL at 07:48

## 2021-12-24 RX ADMIN — ASPIRIN 81 MG CHEWABLE TABLET 81 MG: 81 TABLET CHEWABLE at 07:48

## 2021-12-24 RX ADMIN — OLANZAPINE 5 MG: 10 INJECTION, POWDER, FOR SOLUTION INTRAMUSCULAR at 07:47

## 2021-12-24 ASSESSMENT — ACTIVITIES OF DAILY LIVING (ADL)
ADLS_ACUITY_SCORE: 8
ADLS_ACUITY_SCORE: 8
PREVIOUS_RESPONSIBILITIES: MEAL PREP;HOUSEKEEPING;LAUNDRY;SHOPPING;YARDWORK;MEDICATION MANAGEMENT;FINANCES;DRIVING;WORK;CHILD CARE
ADLS_ACUITY_SCORE: 4
ADLS_ACUITY_SCORE: 8
WHICH_OF_THE_ABOVE_FUNCTIONAL_RISKS_HAD_A_RECENT_ONSET_OR_CHANGE?: AMBULATION;TRANSFERRING;BATHING;DRESSING
ADLS_ACUITY_SCORE: 8
ADLS_ACUITY_SCORE: 4
ADLS_ACUITY_SCORE: 4
ADLS_ACUITY_SCORE: 8
ADLS_ACUITY_SCORE: 4
ADLS_ACUITY_SCORE: 8
ADLS_ACUITY_SCORE: 4
ADLS_ACUITY_SCORE: 8
ADLS_ACUITY_SCORE: 4
ADLS_ACUITY_SCORE: 8

## 2021-12-24 ASSESSMENT — PATIENT HEALTH QUESTIONNAIRE - PHQ9: SUM OF ALL RESPONSES TO PHQ QUESTIONS 1-9: 0

## 2021-12-24 ASSESSMENT — VISUAL ACUITY
OU: GLASSES

## 2021-12-24 NOTE — DISCHARGE SUMMARY
[facility]    Neurology Stroke Discharge Summary    Date of Admission: 12/23/2021  Date of Discharge: 12/24/2021    Disposition: Discharged to home  Primary Care Physician: Sunny Chin      Admission Diagnosis:   Acute ischemic stroke     Discharge Diagnosis:   Acute ischemic stroke of left MCA MAYO watershed infarct due to embolic stroke of undetermined source (ESUS) rule out hypercoagulable state    Problem Leading to Hospitalization ([facility] [place]):   Samia Ordoñez is a 36 year old woman with history of migraines who presents as transfer from [facility] for stroke workup/cares.      Patient was in her normal state of health until 12/21/21 around 7 AM, when she developed numbness/tingling in the right hand. She also noticed she was starting to run into walls and objects on the right side. Presented to [facility] for evaluation and was found to have scattered infarcts in the left MCA territory and left M1 stenosis. Stroke workup was started and she was transferred here for remainder of workup.      Patient endorses severe throbbing headache in frontal area.      Please see H&P dated 12/23/2021 for further details about presentation.    Brief Hospital Course:     # Acute watershed infarct L MAYO and MCA  # ESUS vs hypercoagulable  Patient presented with numbness, tingling in her right arm, slow movement of her left arm on exam she had right lower quadrant field deficit, slow moving R arm, right lower facial assymetry,   was found to have L MCA/MAYO watershed infarct on MRI.   Her work up revealed concern for L M1 stenosis, repeat CT revealed mild to moderate stenosis that does not explain her symptoms. Hgb A1C 5.3, , and her blood pressure was 140 systolic. Considering the location of her infarct was watershed suggesting cardioembolic source, EKG, tele without arrhythmia, TTE without source of stroke, will recommend RIRI, discussed with cardiology  fellow on call and recommended out patient RIRI. Will recommend DAPT for 21 days total followed by full dose aspirin. Her blood pressure in patient was systolic close to 140 mm hg, however the patient says her home blood pressure is within normal limits, will defer to primary care provider to assess for hypertension. Considering her age <50 years and prior history of miscarriage she would benefit from evaluation for hypercoagulable profile with hematology as out patient. Initial work up sent and will defer pending work up to out patient.      Etiology is thought to be ESUS vs hypercoagulable.    Rehab evaluation: OT and PT.     Smoking Cessation: patient is not a smoker    BP Long-term Goal:systolic <130 mm hg    Antithrombotic/Anticoagulant Agent: aspirin 81 mg and ticagrelor BID for 20 days followed by aspirin full dose only    Statins: Started on atorvastatin       Hgb A1C Goal: < 7.0    Complications: None.     Other problems addressed during this hospitalization:  None    PERTINENT INVESTIGATIONS    Labs  Lipid Panel: Recent Labs   Lab Test 12/22/21  1127   CHOL 219*   HDL 46*   *   TRIG 112     A1C:   Lab Results   Component Value Date    A1C 5.3 12/22/2021     INR:   Recent Labs   Lab 12/22/21  1127   INR 0.95      Coag Panel / Hypercoag Workup: Not indicated  Pending test results: Lupus anticoagulant, Beta microglobulin    Echo: TTE 12/22/21  Left ventricular size, wall motion and function are normal. The ejection  fraction is 60-65%.  Normal right ventricle size and systolic function.  A contrast injection (Bubble Study) was performed that was negative for flow across the interatrial septum.  No evidence of left ventricular mass or tumors  No hemodynamically significant valvular abnormalities on 2D or color flow imaging.  Imaging: Impression: CT head, CTA, CT perfusion  1. Evolving subacute infarctions in the left MCA distribution. No  acute cerebral infarct.  2. Suspected acute to subacute left  pontine infarct.  3. Mild stenosis of the left M1 MCA segment, previously severe.   4. No stenosis of the cervical arteries.  5. No acute intracranial hemorrhage.  Endovascular procedure: None     Cardiac Monitoring: Patient had > 24 hrs of cardiac monitor while in hospital.    Findings: No atrial fibrillation was found.    Sleep Apnea Screen:   Questions/Answers      1. Prior to your stroke, have you been told that you snore? Yes.    2. Prior to your stroke, have you been told that you struggle to breath while you are sleeping? No.    3. Prior to your stroke, do you feel tired and sleepy even after getting a normal night of sleep? No.    Sleep Apnea Screen Findings: Patient has 0-1 symptoms of sleep apnea.  Further sleep study is not recommended at this time.    PHQ-9 Depression Screen Score: 0    Education discussed with: patient on blood pressure management and cholesterol management.    During daily rounds, the plan of care was discussed and developed with patient.  Plan of care includes: medicaiton, out patient follow up, RIRI, neurology and hematology out patient work up.    PHYSICAL EXAMINATION  Vital Signs:  B/P: 168/114, T: 97.8, P: 81, R: 16    General:  patient lying in bed without any acute distress     HEENT:  normocephalic/atraumatic  Cardio:  RRR  Pulmonary:  no respiratory distress  Abdomen:  soft, non-tender, non-distended, bowel sounds present  Extremities:  no edema  Skin:  intact, warm/dry     Neurologic  Mental Status:  fully alert, attentive and oriented, follows commands, speech clear and fluent  Cranial Nerves:  visual fields intact, PERRL, EOMI with normal smooth pursuit, facial sensation intact and symmetric, facial movements symmetric, hearing not formally tested but intact to conversation, palate elevation symmetric and uvula midline, no dysarthria, shoulder shrug strong bilaterally, tongue protrusion midlineRight lower visual field defect  Motor:  no abnormal movements, normal tone  throughout, normal muscle bulk, no pronator drift, normal and symmetric rapid finger tapping, able to move all limbs spontaneously, strength 5/5 throughout upper and lower extremities  Reflexes:  2+ and symmetric throughout, no clonus  Sensory:  intact/symmetric to light touch and pin prick throughout upper and lower extremities  Coordination:  FNF and HS intact without dysmetria  Station/Gait:  normal width, stride length, turn, and arm swing     National Institutes of Health Stroke Scale (on day of discharge)  NIHSS Total Score: 1    Modified [place] Score (Discharge)  1-No significant disability despite symptoms    Medications    Discharge Medication List as of 12/24/2021  4:29 PM      START taking these medications    Details   aspirin (ASA) 325 MG tablet Take 1 tablet (325 mg) by mouth daily, Disp-60 tablet, R-3, E-Prescribe      aspirin (ASA) 81 MG chewable tablet Take 1 tablet (81 mg) by mouth daily for 20 days, Disp-20 tablet, R-0, E-Prescribe      atorvastatin (LIPITOR) 40 MG tablet Take 1 tablet (40 mg) by mouth every evening, Disp-30 tablet, R-3, E-Prescribe      ticagrelor (BRILINTA) 90 MG tablet Take 1 tablet (90 mg) by mouth 2 times daily for 20 days, Disp-40 tablet, R-0, E-Prescribe         CONTINUE these medications which have NOT CHANGED    Details   albuterol (PROAIR HFA/PROVENTIL HFA/VENTOLIN HFA) 108 (90 Base) MCG/ACT inhaler Inhale 2 puffs into the lungs every 6 hours as needed for shortness of breath / dyspnea or wheezing, Historical      naproxen (NAPROSYN) 500 MG tablet Take 500 mg by mouth 2 times daily as needed for moderate pain, Historical             Additional recommendations and follow up:       Oncology/Hematology Adult Referral      Adult Neurology Referral      Reason for your hospital stay    You were admitted for evaluation of a stroke.     Activity    Your activity upon discharge: activity as tolerated     Adult P/[facility] Follow-up and recommended labs and tests    Follow up with  primary care provider, Sunny Chin, within 7-10 days for hospital follow- up.  No follow up labs or test are needed.    Follow up with neurology clinic in 4-6 weeks for hospital follow- up.  No follow up labs or test are needed.  Follow up with hematology clinic 4-6 weeks for additional work up of your stroke.   Obtain an echocardiogram (RIRI) of your heart at the next available appointment.    Appointments on [place] and/or [facility] (with [facility] or [facility] provider or service). Call [phone] if you haven't heard regarding these appointments within 7 days of discharge.     Discharge Instructions    - Start taking Aspirin 81 mg daily and Brilinta 90 mg twice daily for 20 days (until 1/14/22)  - Starting on 1/15/22, start taking Aspirin 325 mg daily life long.    - Follow up with primary care provider within 7-10 days for hospital follow- up.      - Follow up with neurology clinic in 4-6 weeks for hospital follow- up.    - Follow up with hematology clinic 4-6 weeks for additional work up of your stroke. A referral is included.  - Obtain an echocardiogram (RIRI) of your heart at the next available appointment.     Echocardiogram RIRI    Administration of IV contrast will be tailored to this examination per the appropriate written protocol listed in the Echocardiography department Protocol Book, or by the supervising Cardiologist. This may result in an order change.    Use of contrast is at the discretion of the supervising Cardiologist.     Diet    Follow this diet upon discharge: Regular       Patient was seen and discussed with the Attending, Dr. Berumen.    Linus Arana MD   Neurology PGY-1  Pager: [phone]

## 2021-12-24 NOTE — PROGRESS NOTES
12/24/21 9030   Quick Adds   Type of Visit Initial Occupational Therapy Evaluation   Living Environment   People in home child(mya), dependent;spouse   Current Living Arrangements house   Home Accessibility stairs to enter home;stairs within home   Number of Stairs, Main Entrance 1   Number of Stairs, Within Home, Primary other (see comments);7  (split level home, ~7 steps up to main level once inside home)   Stair Railings, Within Home, Primary railings safe and in good condition   Transportation Anticipated family or friend will provide   Living Environment Comments Pt lives in a split level home with her  and 15yo son. All needs can be met on main level of home once ascending ~7 steps. Pt has a tub/shower combo.  works out of the home, but is home by 3:30pm and can assist prn. Son also on kedar break currently and can assist as needed.    Self-Care   Usual Activity Tolerance good   Current Activity Tolerance moderate   Regular Exercise No   Equipment Currently Used at Home none   Activity/Exercise/Self-Care Comment Pt is IND with all ADLs and mobility at baseline   Instrumental Activities of Daily Living (IADL)   Previous Responsibilities meal prep;housekeeping;laundry;shopping;yardwork;medication management;finances;driving;work;   IADL Comments Pt and  share responsibility for all IADLs. Pt states it is split 50/50. Pt works from home; office is downstairs.    Disability/Function   Hearing Difficulty or Deaf no   Wear Glasses or Blind yes   Vision Management glasses   Concentrating, Remembering or Making Decisions Difficulty no   Difficulty Communicating no   Difficulty Eating/Swallowing no   Walking or Climbing Stairs Difficulty no   Dressing/Bathing Difficulty no   Toileting issues no   Doing Errands Independently Difficulty (such as shopping) no   Fall history within last six months no   Change in Functional Status Since Onset of Current Illness/Injury yes   General  Information   Onset of Illness/Injury or Date of Surgery 12/23/21   Referring Physician Prachi Ramos MD   Patient/Family Therapy Goal Statement (OT) return home   Additional Occupational Profile Info/Pertinent History of Current Problem Samia Ordoñez is a 36 year old woman with history of migraines who presents as transfer from [facility] for stroke workup/cares.    Existing Precautions/Restrictions no known precautions/restrictions   Limitations/Impairments visual   Left Upper Extremity (Weight-bearing Status) full weight-bearing (FWB)   Right Upper Extremity (Weight-bearing Status) full weight-bearing (FWB)   Left Lower Extremity (Weight-bearing Status) full weight-bearing (FWB)   Right Lower Extremity (Weight-bearing Status) full weight-bearing (FWB)   General Observations and Info activity order: up with assist   Cognitive Status Examination   Orientation Status orientation to person, place and time   Affect/Mental Status (Cognitive) WFL;flat/blunted affect   Follows Commands WFL   Safety Deficit minimal deficit;insight into deficits/self-awareness   Memory Deficit minimal deficit;short-term memory   Executive Function Deficit minimal deficit;insight/awareness of deficits;self-monitoring/self-correction   Cognitive Status Comments Pt's cog appears WFL, but pt did demonstrate some difficulty w/ STM, requiring vc to initiate tasks and frequent vc to scan environment to compensate for R vision loss   Visual Perception   Visual Impairment/Limitations peripheral vision impaired right   Visual Processing Deficit visual attention, right   Impact of Vision Impairment on Function (Vision) Pt bumping into items on R side during ambulation, requires vc for head turns to search environment for objects    Sensory   Sensory Quick Adds No deficits were identified   Sensory Comments Pt denies n/t    Pain Assessment   Patient Currently in Pain No   Integumentary/Edema   Integumentary/Edema no deficits were  identifed   Posture   Posture not impaired   Range of Motion Comprehensive   General Range of Motion bilateral upper extremity ROM WFL   Strength Comprehensive (MMT)   Comment, General Manual Muscle Testing (MMT) Assessment Grossly WFL   Muscle Tone Assessment   Muscle Tone Quick Adds No deficits were identified   Coordination   Upper Extremity Coordination No deficits were identified   Gross Motor Coordination No deficits were identified   Fine Motor Coordination Pt with mod FM incoordination    Bed Mobility   Bed Mobility supine-sit   Supine-Sit [place] (Bed Mobility) supervision   Transfers   Transfers sit-stand transfer;toilet transfer   Sit-Stand Transfer   Sit-Stand [place] (Transfers) supervision   Toilet Transfer   Type (Toilet Transfer) stand-sit;sit-stand   [place] Level (Toilet Transfer) supervision   Balance   Balance Assessment no deficits were identified   Activities of Daily Living   BADL Assessment grooming;toileting;upper body dressing;bathing   Bathing Assessment   [place] Level (Bathing) verbal cues;supervision   Upper Body Dressing Assessment   [place] Level (Upper Body Dressing) verbal cues;supervision   Grooming Assessment   [place] Level (Grooming) verbal cues;supervision   Position (Grooming) sink side   Comment (Grooming) VC to locate items on R side   Toileting   [place] Level (Toileting) supervision   Clinical Impression   Criteria for Skilled Therapeutic Interventions Met (OT) yes;meets criteria;skilled treatment is necessary   OT Diagnosis Pt with R peripheral vision loss and decreased FM coordination, impacting ability to safely and independently perform I/ADLs and safely navigate environment during ambulation   OT Problem List-Impairments impacting ADL problems related to;cognition;strength;vision   Assessment of Occupational Performance 3-5 Performance Deficits   Identified Performance Deficits community mobility, driving, g/h, bathing, home mgmt,  ambulation   Planned Therapy Interventions (OT) ADL retraining;IADL retraining;cognition;strengthening;visual perception   Clinical Decision Making Complexity (OT) low complexity   Therapy Frequency (OT) Daily   Predicted Duration of Therapy 1 week   Anticipated Equipment Needs Upon Discharge (OT) other (see comments)  (TBD)   Risk & Benefits of therapy have been explained evaluation/treatment results reviewed;care plan/treatment goals reviewed;risks/benefits reviewed;participants voiced agreement with care plan;current/potential barriers reviewed;participants included;patient   Comment-Clinical Impression Pt with R peripheral vision loss and decreased FM coordination, impacting ability to safely and independently perform I/ADLs and safely navigate environment during ambulation. Will benefit from skilled OT to progress safety and IND with I/ADLs.    OT Discharge Planning    OT Discharge Recommendation (DC Rec) Home with assist;home with outpatient occupational therapy   OT Rationale for DC Rec Pt mobilizing well, able to ascend/descend stairs safely. Pt safe to discharge to home at this time with assist for higher level IADLs such as cooking, home mgmt, finances, and med mgmt, and to discontinue driving at this time. Recommend supervision for majority of I/ADL tasks 2/2 R peripheral vision loss, as pt requires vc to locate items. Pt would benefit from OP OT to focus on compensatory methods to assist with R vision loss and to progress safety and independence with I/ADLs.   OT Brief overview of current status  SBA for ambulation, encouraged walks in hallways, may need vc for turn head to scan environment for objects   Total Evaluation Time (Minutes)   Total Evaluation Time (Minutes) 5

## 2021-12-24 NOTE — PLAN OF CARE
PT 6A: PT orders acknowledged and appreciated. Following discussion with OT and chart review, pt with no acute IP PT needs at this time. Pt is mobilizing at supervision to IND mobility for ambulation and with stairs. Please defer to OT for discharge recommendations. Will complete orders at this time. Please reorder with change in status. Thank you for your referral.

## 2021-12-24 NOTE — PROVIDER NOTIFICATION
0517 - Pt states one time dose of Toradol did not give her relief of headache. Wondering if she could have anything else for her pain.

## 2021-12-24 NOTE — CONSULTS
Care Management Initial Consult    General Information  Assessment completed with: PatientSamia  Type of CM/SW Visit: Initial Assessment    Primary Care Provider verified and updated as needed: No   Readmission within the last 30 days: no previous admission in last 30 days         Advance Care Planning: Advance Care Planning Reviewed: no concerns identified          Communication Assessment  Patient's communication style: spoken language (English or Bilingual)    Hearing Difficulty or Deaf: no   Wear Glasses or Blind: yes    Cognitive  Cognitive/Neuro/Behavioral: WDL        Orientation: oriented x 4     Best Language: 0 - No aphasia  Speech: clear,spontaneous,logical    Living Environment:   People in home: child(mya), dependent,spouse     Current living Arrangements: house      Able to return to prior arrangements: yes       Family/Social Support:  Care provided by: self  Provides care for: child(mya) (14yr old son)  Marital Status:   ,Children          Description of Support System: Supportive    Support Assessment: Adequate family and caregiver support    Current Resources:   Patient receiving home care services: No     Community Resources:  (OP PT)  Equipment currently used at home: none  Supplies currently used at home: None    Employment/Financial:  Employment Status: employed full-time, will need a note from Teams for work absence            Functional Status:  Prior to admission patient needed assistance: No.     Mental Health Status:  Mental Health Status: No Current Concerns         Additional Information:  Writer met with patient, introduced RNCC role and started discharge planning discussion. Patient confirmed OT recommendations for OPOT and is agreeable to this plan. 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 As  a result, OPOT is recommended and she advised to not drive. Patient would appreciate a note from Teams to address absence from work. Patient reports her  will drive her to appointments and is supportive of her care. Patient is agreeable to  Discharge plan thus far and is looking forward to going home ASAP.      Juliette Yin RN   RNCC [facility]

## 2021-12-24 NOTE — PLAN OF CARE
Status: admitted for ischemic stroke  Vitals: htn within parameters  Neuros: improving numbness to R hand and improving dexterity  IV: PIVs removed  Pain: HA improved after IM zyprexa today  Activity: up independently  Social:  Jesus coming to retrieve pt  Plan: perform discharge teaching with pt when orders are signed

## 2021-12-24 NOTE — H&P
[facility]    Stroke Admission Note    Chief Complaint  Headache, right hand numbness, right visual field cut     HPI  Samia Ordoñez is a 36 year old woman with history of migraines who presents as transfer from [facility] for stroke workup/cares.     Patient was in her normal state of health until 12/21/21 around 7 AM, when she developed numbness/tingling in the right hand. She also noticed she was starting to run into walls and objects on the right side. Presented to [facility] for evaluation and was found to have scattered infarcts in the left MCA territory and left M1 stenosis. Stroke workup was started and she was transferred here for remainder of workup.     Patient endorses severe throbbing headache in frontal area.     TPA Treatment   Not given due to unclear or unfavorable risk-benefit profile for extended window thrombolysis beyond the conventional 4.5 hour time window.    Endovascular Treatment  Not initiated due to absence of proximal vessel occlusion    Impression   #Acute ischemic stroke of atheroembolism due to large-artery atherosclerosis vs undetermined etiology (ex. Hypercoagulability disorder)    Plan  #Acute ischemic stroke  - Admit to Neurology 6A  - Neurochecks Q 4 hours  - SBP < 180  - Avoid hypotonic IV fluids  - Daily aspirin 81 mg for secondary stroke prevention + Brilinta 90mg daily x 90 days then aspirin alone   - Statin: atorvastatin 40 mg daily   - Consider hypercoagulable workup - pt reports hx of one miscarriage many years ago.   - RIRI   - Telemetry, EKG  - Bedside Glucose Monitoring  - Nutrition: NPO  - A1c, Lipid Panel, Troponin x 3 - already done at OSH   - PT/OT/SLP  - PM&R not indicated due to lack of severe deficits   - Stroke Education  - Depression Screen  - Apnea Screen  - Euthermia, Euglycemia    #Headahce  - S/P toradol and fluids  - Will try IV Zyprexa       Prophylaxis            For VTE Prevention:  -  enoxaparin    For Acid Suppression:  - GI prophylaxis is not indicated    Code Status  Full Code    During initial physical assessment, the plan of care was discussed and developed with patient.  Plan of care includes: stroke workup.    Patient was admitted via transfer from [facility]. I have reviewed all records from [facility] admission.     The patient is admitted to the stroke service.     To be staffed with Dr. Berumen in the AM.     Ney Myers MD  Neurology Resident  ___________________________________________________    Nutrition:  Orders Placed This Encounter      NPO for Medical/Clinical Reasons Except for: No Exceptions      Past Medical History   Migraines    Past Surgical History   No past surgical history on file.  Medications   Home Meds  Prior to Admission medications    Medication Sig Start Date End Date Taking? Authorizing Provider   albuterol (PROAIR HFA/PROVENTIL HFA/VENTOLIN HFA) 108 (90 Base) MCG/ACT inhaler Inhale 2 puffs into the lungs every 6 hours as needed for shortness of breath / dyspnea or wheezing    Unknown, Entered By History   naproxen (NAPROSYN) 500 MG tablet Take 500 mg by mouth 2 times daily as needed for moderate pain    Unknown, Entered By History       Scheduled Meds    aspirin  81 mg Oral Daily     atorvastatin  40 mg Oral QPM     enoxaparin ANTICOAGULANT  40 mg Subcutaneous Q24H     ketorolac  15 mg Intravenous Once     sodium chloride (PF)  3 mL Intracatheter Q8H     ticagrelor  90 mg Oral BID       Infusion Meds    - MEDICATION INSTRUCTIONS -       - MEDICATION INSTRUCTIONS -       sodium chloride         PRN Meds  lidocaine 4%, lidocaine (buffered or not buffered), - MEDICATION INSTRUCTIONS -, - MEDICATION INSTRUCTIONS -, sodium chloride (PF), sodium chloride    Allergies   Allergies   Allergen Reactions     Sumatriptan      Imitrex      Adhesive Tape Rash     Sulfamethoxazole-Trimethoprim Rash     Family History   Family History   Problem Relation Age of Onset      Cerebrovascular Disease Father      Social History   Denies tobacco, alcohol, recreational drug use. Works at [employer]. Has one child.    Review of Systems   The 10 point Review of Systems is negative other than noted in the HPI or here.        PHYSICAL EXAMINATION  Temp:  [98.5  F (36.9  C)] 98.5  F (36.9  C)  Pulse:  [66-94] 83  Resp:  [9-30] 16  BP: (131-182)/() 149/100  SpO2:  [91 %-98 %] 98 %    General:  patient lying in bed without any acute distress    HEENT:  normocephalic/atraumatic  Cardio:  RRR  Pulmonary:  no respiratory distress  Abdomen:  soft, non-tender  Extremities:  no edema  Skin:  intact, warm/dry     Neurologic  Mental Status:  alert, oriented x 3, follows commands, speech clear and fluent. Able to provide appropriate HPI.  Cranial Nerves:  Right homonymous hemianopia. PERRL, EOMI with normal smooth pursuit, facial sensation intact. Subtle left nasolabial fold flattening, facial movements symmetric, hearing not formally tested but intact to conversation, palate elevation symmetric and uvula midline, no dysarthria, shoulder shrug strong bilaterally, tongue protrusion midline  Motor:  normal muscle tone and bulk, no abnormal movements, able to move all limbs spontaneously, strength 5/5 throughout upper and lower extremities, no pronator drift  Reflexes:  toes down-going. 2+ and brisk reflexes at brachioradialis and biceps tendons.   Sensory:  light touch sensation intact and symmetric throughout upper and lower extremities, no extinction on double simultaneous stimulation   Coordination:  normal finger-to-nose and heel-to-shin bilaterally without dysmetria, rapidly alternating movements slow on the right.   Station/Gait:  deferred    Dysphagia Screen  Passed screening, no dysarthria - Regular Diet with thin liquids  12/24/2021     Modified Rubén Score (Pre-morbid)  0-No deficits    Stroke Scales    NIHSS  Interval transfer (12/24/21 0809)   Interval Comments     1a. Level of Consciousness  0-->Alert, keenly responsive   1b. LOC Questions 0-->Answers both questions correctly   1c. LOC Commands 0-->Performs both tasks correctly   2.   Best Gaze 0-->Normal   3.   Visual 2-->Complete hemianopia   4.   Facial Palsy 1-->Minor paralysis (flattened nasolabial fold, asymmetry on smiling)   5a. Motor Arm, Left 0-->No drift, limb holds 90 (or 45) degrees for full 10 secs   5b. Motor Arm, Right 0-->No drift, limb holds 90 (or 45) degrees for full 10 secs   6a. Motor Leg, Left 0-->No drift, leg holds 30 degree position for full 5 secs   6b. Motor Leg, right 0-->No drift, leg holds 30 degree position for full 5 secs   7.   Limb Ataxia 0-->Absent   8.   Sensory 1-->Mild-to-moderate sensory loss, patient feels pinprick is less sharp or is dull on the affected side, or there is a loss of superficial pain with pinprick, but patient is aware of being touched   9.   Best Language 0-->No aphasia, normal   10. Dysarthria 0-->Normal   11. Extinction and Inattention  0-->No abnormality   Total 4 (12/24/21 0809)       Imaging  I personally reviewed all imaging; relevant findings per the HPI.    Lab Results Data   CBC  Recent Labs   Lab 12/22/21  1127   WBC 7.7   RBC 4.69   HGB 13.8   HCT 41.3        Basic Metabolic Panel   Recent Labs   Lab 12/22/21  1127      POTASSIUM 4.3   CHLORIDE 106   CO2 19*   BUN 11   CR 0.63   GLC 95   ROGER 9.5     Liver Panel  No results for input(s): PROTTOTAL, ALBUMIN, BILITOTAL, ALKPHOS, AST, ALT, BILIDIRECT in the last 168 hours.  INR    Recent Labs   Lab Test 12/22/21  1127   INR 0.95      Lipid Profile    Recent Labs   Lab Test 12/22/21  1127   CHOL 219*   HDL 46*   *   TRIG 112     A1C    Recent Labs   Lab Test 12/22/21  1127   A1C 5.3     Troponin I  No results for input(s): TROPONIN, GHTROP in the last 168 hours.       Stroke Code / Stroke Consult Data Data    Not a stroke code

## 2021-12-24 NOTE — PLAN OF CARE
Status: s/p watershed infarct in the left MCA-MAYO territory with a short segment left M1 stenosis. Hx migraines   Vitals: VSS on RA. CCM   Neuros: A&Ox4. Intermittent R handed numbness since onset of stroke, peripheral vision loss in R eye. Finger to nose slow/deliberate with R hand, smooth on with L hand.   IV: R PIV running fluids 250 mL/hr   Labs/Electrolytes: COVID-19 negative 12/22   Resp/trach: WNL  Diet: NPO   Bowel status: BS+  : Voiding spontaneously   Skin: Intact   Pain: Headache, pt given 1 time dose toradol which provided no relief. MD paged and 1 time dose of zyprexa ordered. Page MD if zyprexa does not provide relief.   Activity: Up with 1, GB  Social: , Jesus   Plan: Continue to monitor and follow POC.     Arrived from:  [facility] ED to  at 2330 on 12/23.   Belongings/meds:  With patient   2 RN Skin Assessment Completed by:  Yue MCNULTY RN and Samia JACKSON RN

## 2021-12-24 NOTE — PROGRESS NOTES
Discharge time/date: 12/24/2021 1435  Walked or Wheelchair: Wheelchair  Reviewed AVS with patient: Yes  Medication due times added to AVS in EPIC: No  Verbalized understanding of discharge with teachback: Yes  Medications retrieved from pharmacy: Yes  Supplies sent home: NA  Belongings from security with patient: NA     No

## 2021-12-24 NOTE — PLAN OF CARE
SLP: Orders received. Chart reviewed and discussed with care team.  SLP not indicated due as pt has passed a nurse swallow screen. Per chart review and discussion with RN, no speech or language concerns either.  Defer discharge recommendations to PT/OT and medical team.      Will complete orders. Please re consult should concerns arise.

## 2021-12-26 NOTE — PLAN OF CARE
Occupational Therapy Discharge Summary    Reason for therapy discharge:    Discharged to home with outpatient therapy.    Progress towards therapy goal(s). See goals on Care Plan in [facility] electronic health record for goal details.  Goals partially met.  Barriers to achieving goals:   discharge from facility.    Therapy recommendation(s):    Continued therapy is recommended.  Rationale/Recommendations:  Pt would benefit from further therapy to address vision loss and promote optimal independence with all  mobility and ADLs/IADLs.

## 2021-12-27 ENCOUNTER — HOSPITAL ENCOUNTER (OUTPATIENT)
Dept: EDUCATION SERVICES | Facility: CLINIC | Age: 36
End: 2021-12-27
Payer: COMMERCIAL

## 2021-12-27 LAB
B2 MICROGLOB TUMOR MARKER SER-MCNC: 1.3 MG/L
DRVVT SCREEN RATIO: 1.03
INR PPP: 1 (ref 0.85–1.15)
LA PPP-IMP: NEGATIVE
LUPUS INTERPRETATION: NORMAL
PTT RATIO: 1
THROMBIN TIME: 15.7 SECONDS (ref 13–19)

## 2021-12-27 NOTE — CONSULTS
Stroke Education Note    The following information has been reviewed with the patient:    1. Warning signs of stroke    2. Calling 911 if having warning signs of stroke    3. All modifiable risk factors: hypertension, CAD, atrial fib, diabetes, hypercholesterolemia, smoking, substance abuse, diet, physical inactivity, obesity, sleep apnea.    4. Patient's risk factors for stroke which include: HLD    5. Follow-up plan for after discharge    6. Discharge medications which include: aspirin, lipitor, Brilinta    In addition, the above information was given to the patient in writing as a part of the [facility] Stroke Class Handout.    Learner's response to risk factors / lifestyle modification education: Ability to change Taking steps     Krystal Dugan RN     [facility] Stroke class as an outpatient via Teams meeting.  Patient was a good participant of learning and asked relevant questions. Able to teach back.

## 2021-12-29 ENCOUNTER — HOSPITAL ENCOUNTER (OUTPATIENT)
Dept: CARDIOLOGY | Facility: CLINIC | Age: 36
Discharge: HOME | End: 2021-12-29
Attending: PSYCHIATRY & NEUROLOGY | Admitting: PSYCHIATRY & NEUROLOGY
Payer: COMMERCIAL

## 2021-12-29 ENCOUNTER — TELEPHONE (OUTPATIENT)
Dept: NEUROLOGY | Facility: CLINIC | Age: 36
End: 2021-12-29

## 2021-12-29 VITALS
RESPIRATION RATE: 16 BRPM | DIASTOLIC BLOOD PRESSURE: 94 MMHG | HEART RATE: 83 BPM | SYSTOLIC BLOOD PRESSURE: 146 MMHG | OXYGEN SATURATION: 99 %

## 2021-12-29 LAB — LVEF ECHO: NORMAL

## 2021-12-29 PROCEDURE — 93312 ECHO TRANSESOPHAGEAL: CPT | Mod: 26 | Performed by: INTERNAL MEDICINE

## 2021-12-29 PROCEDURE — 250N000011 HC RX IP 250 OP 636: Performed by: INTERNAL MEDICINE

## 2021-12-29 PROCEDURE — 93325 DOPPLER ECHO COLOR FLOW MAPG: CPT

## 2021-12-29 PROCEDURE — 250N000009 HC RX 250: Performed by: INTERNAL MEDICINE

## 2021-12-29 PROCEDURE — 93325 DOPPLER ECHO COLOR FLOW MAPG: CPT | Mod: 26 | Performed by: INTERNAL MEDICINE

## 2021-12-29 PROCEDURE — 258N000001 HC RX 258: Performed by: INTERNAL MEDICINE

## 2021-12-29 PROCEDURE — 99152 MOD SED SAME PHYS/QHP 5/>YRS: CPT | Mod: GC | Performed by: INTERNAL MEDICINE

## 2021-12-29 PROCEDURE — 76376 3D RENDER W/INTRP POSTPROCES: CPT | Mod: 26 | Performed by: INTERNAL MEDICINE

## 2021-12-29 PROCEDURE — 93320 DOPPLER ECHO COMPLETE: CPT | Mod: 26 | Performed by: INTERNAL MEDICINE

## 2021-12-29 RX ORDER — NALOXONE HYDROCHLORIDE 0.4 MG/ML
0.4 INJECTION, SOLUTION INTRAMUSCULAR; INTRAVENOUS; SUBCUTANEOUS
Status: DISCONTINUED | OUTPATIENT
Start: 2021-12-29 | End: 2021-12-30 | Stop reason: HOSPADM

## 2021-12-29 RX ORDER — LIDOCAINE 40 MG/G
CREAM TOPICAL
Status: DISCONTINUED | OUTPATIENT
Start: 2021-12-29 | End: 2021-12-30 | Stop reason: HOSPADM

## 2021-12-29 RX ORDER — ACYCLOVIR 200 MG/1
9.5 CAPSULE ORAL
Status: DISCONTINUED | OUTPATIENT
Start: 2021-12-29 | End: 2021-12-30 | Stop reason: HOSPADM

## 2021-12-29 RX ORDER — SODIUM CHLORIDE 9 MG/ML
INJECTION, SOLUTION INTRAVENOUS CONTINUOUS PRN
Status: DISCONTINUED | OUTPATIENT
Start: 2021-12-29 | End: 2021-12-30 | Stop reason: HOSPADM

## 2021-12-29 RX ORDER — FLUMAZENIL 0.1 MG/ML
0.2 INJECTION, SOLUTION INTRAVENOUS
Status: DISCONTINUED | OUTPATIENT
Start: 2021-12-29 | End: 2021-12-30 | Stop reason: HOSPADM

## 2021-12-29 RX ORDER — LIDOCAINE HYDROCHLORIDE 20 MG/ML
15 SOLUTION OROPHARYNGEAL ONCE
Status: COMPLETED | OUTPATIENT
Start: 2021-12-29 | End: 2021-12-29

## 2021-12-29 RX ORDER — FENTANYL CITRATE 50 UG/ML
25 INJECTION, SOLUTION INTRAMUSCULAR; INTRAVENOUS
Status: DISCONTINUED | OUTPATIENT
Start: 2021-12-29 | End: 2021-12-30 | Stop reason: HOSPADM

## 2021-12-29 RX ORDER — NALOXONE HYDROCHLORIDE 0.4 MG/ML
0.2 INJECTION, SOLUTION INTRAMUSCULAR; INTRAVENOUS; SUBCUTANEOUS
Status: DISCONTINUED | OUTPATIENT
Start: 2021-12-29 | End: 2021-12-30 | Stop reason: HOSPADM

## 2021-12-29 RX ORDER — FENTANYL CITRATE 50 UG/ML
50 INJECTION, SOLUTION INTRAMUSCULAR; INTRAVENOUS ONCE
Status: DISCONTINUED | OUTPATIENT
Start: 2021-12-29 | End: 2021-12-30 | Stop reason: HOSPADM

## 2021-12-29 RX ADMIN — LIDOCAINE HYDROCHLORIDE 30 ML: 20 SOLUTION ORAL; TOPICAL at 13:34

## 2021-12-29 RX ADMIN — FENTANYL CITRATE 50 MCG: 50 INJECTION, SOLUTION INTRAMUSCULAR; INTRAVENOUS at 13:55

## 2021-12-29 RX ADMIN — SODIUM CHLORIDE 9.5 ML: 9 INJECTION, SOLUTION INTRAMUSCULAR; INTRAVENOUS; SUBCUTANEOUS at 14:11

## 2021-12-29 RX ADMIN — FENTANYL CITRATE 50 MCG: 50 INJECTION, SOLUTION INTRAMUSCULAR; INTRAVENOUS at 13:49

## 2021-12-29 RX ADMIN — MIDAZOLAM 1 MG: 1 INJECTION INTRAMUSCULAR; INTRAVENOUS at 13:53

## 2021-12-29 RX ADMIN — MIDAZOLAM 2 MG: 1 INJECTION INTRAMUSCULAR; INTRAVENOUS at 13:50

## 2021-12-29 RX ADMIN — MIDAZOLAM 0.5 MG: 1 INJECTION INTRAMUSCULAR; INTRAVENOUS at 13:58

## 2021-12-29 RX ADMIN — MIDAZOLAM 0.5 MG: 1 INJECTION INTRAMUSCULAR; INTRAVENOUS at 13:55

## 2021-12-29 RX ADMIN — TOPICAL ANESTHETIC 0.5 ML: 200 SPRAY DENTAL; PERIODONTAL at 13:35

## 2021-12-29 NOTE — TELEPHONE ENCOUNTER
Fidelina from [employer] called to request that Samia's [facility] paperwork be filled out by Dr. Berumen. Please reach Samia at [phone]

## 2021-12-29 NOTE — PROGRESS NOTES
Pt arrived in ECHO department for scheduled RIRI.   Procedure explained, questions answered and consent signed. Discharge instructions discussed with patient and given written copy.  Pt's throat numbed at 1330, therefore pt will not be able to eat or drink until 2 hours after at 1530. Informed pt of this time and encouraged to start with warm fluids and soft foods.    Pt tolerated procedure well, and was given a total of 100 mcg IV fentanyl and 4 mg IV versed for conscious sedation.  Pt denied throat or chest pain after RIRI complete.   RIRI probe 63 used for procedure.  Pt denied chest or throat pain after procedure and was D/C home after awake and VSS.  Escorted out to front lobby by staff in w/c to meet pt's ride home.

## 2021-12-29 NOTE — TELEPHONE ENCOUNTER
Noting an encounter in care everywhere that pt contacted her PCP for completion of paperwork.    LVM for pt still in case Dr. Berumen is required to fill out, but historically this paperwork is managed by the PCP.    Sarah Molina BS, RN, SCRN  RN Stroke Neurology Care Coordinator  [facility] Neuroscience Service Line

## 2021-12-30 NOTE — TELEPHONE ENCOUNTER
Received call from pt this am asking for support in getting her FMLA and STD paperwork completed. She states she received a mychart message from Dr. Boateng refusing to complete the paperwork and stating that it needs to be completed by the provider managing her stroke.    I contacted the [facility] clinic and left a high priority message with Dr. Chin regarding this issue Stating that the pt will not be seeing Dr. Berumen outpatient and that neurology appointments are 8-12 weeks out which is not an appropriate timeline for the pt to wait for this paperwork to be completed. I provided my callback information if Dr. Chin has additional questions or if she would like me to send supporting documentation from the pt's inpatient stay Pt is scheduled for an appointment with Dr. Chin on 1/13/22.    I then left a VM with Samia informing her that I left the message for Dr. Chin and expect to receive a callback tomorrow or that the patient may receive a call.    Sarah Molina BS, RN, SCRN  RN Stroke Neurology Care Coordinator  [facility] Neuroscience Service Line

## 2022-01-10 ENCOUNTER — OFFICE VISIT (OUTPATIENT)
Dept: NEUROLOGY | Facility: CLINIC | Age: 37
End: 2022-01-10
Payer: COMMERCIAL

## 2022-01-10 VITALS
HEART RATE: 67 BPM | WEIGHT: 183.4 LBS | DIASTOLIC BLOOD PRESSURE: 79 MMHG | HEIGHT: 64 IN | BODY MASS INDEX: 31.31 KG/M2 | SYSTOLIC BLOOD PRESSURE: 126 MMHG

## 2022-01-10 DIAGNOSIS — I69.30 SEQUELAE, POST-STROKE: ICD-10-CM

## 2022-01-10 DIAGNOSIS — I63.89: Primary | ICD-10-CM

## 2022-01-10 PROCEDURE — 99215 OFFICE O/P EST HI 40 MIN: CPT | Performed by: PSYCHIATRY & NEUROLOGY

## 2022-01-10 ASSESSMENT — MIFFLIN-ST. JEOR: SCORE: 1496.9

## 2022-01-10 NOTE — LETTER
1/10/2022         RE: Samia Ordoñez  [address]  [place] [address]        Dear Colleague,    Thank you for referring your patient, Samia Ordoñez, to the [facility] [place]. Please see a copy of my visit note below.    INITIAL NEUROLOGY CONSULTATION    DATE OF VISIT: 1/10/2022  MRN: [id number]  PATIENT NAME: Samia Ordoñez  YOB: 1985    REFERRING PROVIDER: Marylou Lopez    Chief Complaint   Patient presents with     New Patient     Stroke on December 22, 2021       SUBJECTIVE:                                                      HPI:   Samia Ordoñez is a 36 year old female whom I have been asked by Dr. Lopez to see in consultation for stroke.  Per chart review Samia was hospitalized at [facility] 12.23-12.24.21 for left-sided MCA and MAYO watershed infarcts, felt to be embolic in etiology.  She has a history of migraines and presented to [facility] after she developed numbness and tingling in the right hand.  She was also noticing some clumsiness and neglect on that side.  Imaging revealed left M1 stenosis and the strokes.  She was transferred to the [facility] for further work-up.  She was also experiencing throbbing frontal headaches at the time.  Hemoglobin A1c was 5.3.  LDL was 151, and blood pressures 140s systolic.  TTE was unremarkable.  aPTT normal, negative beta-2 microglobulin and lupus panel.  RIRI was completed as outpatient, results pending.  It was also recommended that she see hematology for hypercoagulopathy work-up as outpatient.  This is scheduled for April.  She was discharged on aspirin and Brilinta, as well as Lipitor.  Plan is for transition to full-strength aspirin on 1.15.22.    Her father had strokes. She had one miscarriage.  She had a healthy child thereafter, though he was born a little early at 27 weeks.    She feels mostly back to normal.  She still feels cognitively foggy and the harder she tries to think the  more foggy she becomes.  She has been trying to do puzzles on her computer, games to keep her mind sharp.  She does not feel that she could work at this point.  She also asks about driving.  Her numbness in the hand has completely resolved.  She does notice that her peripheral vision in the right eye is improving.    She did have a RIRI completed, and says that as far she knows the results are normal.  It is not clear why these are not available to review.  She is not supposed to follow-up with cardiology.    No past medical history on file.  No past surgical history on file.    aspirin (ASA) 81 MG chewable tablet, Take 1 tablet (81 mg) by mouth daily for 20 days  atorvastatin (LIPITOR) 40 MG tablet, Take 1 tablet (40 mg) by mouth every evening  ticagrelor (BRILINTA) 90 MG tablet, Take 1 tablet (90 mg) by mouth 2 times daily for 20 days  albuterol (PROAIR HFA/PROVENTIL HFA/VENTOLIN HFA) 108 (90 Base) MCG/ACT inhaler, Inhale 2 puffs into the lungs every 6 hours as needed for shortness of breath / dyspnea or wheezing (Patient not taking: Reported on 1/10/2022)  [START ON 1/15/2022] aspirin (ASA) 325 MG tablet, Take 1 tablet (325 mg) by mouth daily  naproxen (NAPROSYN) 500 MG tablet, Take 500 mg by mouth 2 times daily as needed for moderate pain (Patient not taking: Reported on 1/10/2022)    No current facility-administered medications on file prior to visit.    Allergies   Allergen Reactions     Sumatriptan      Imitrex      Adhesive Tape Rash     Sulfamethoxazole-Trimethoprim Rash        Problem (# of Occurrences) Relation (Name,Age of Onset)    Cerebrovascular Disease (1) Father        Social History     Tobacco Use     Smoking status: Former Smoker     Smokeless tobacco: Never Used   Substance Use Topics     Alcohol use: Yes     Comment: socially      Drug use: None       REVIEW OF SYSTEMS:                                                      10-point review of systems is negative except as mentioned above in HPI.  "    EXAM:                                                      Physical Exam:   Vitals: /79 (BP Location: Right arm, Patient Position: Sitting)   Pulse 67   Ht 1.626 m (5' 4\")   Wt 83.2 kg (183 lb 6.4 oz)   BMI 31.48 kg/m    BMI= Body mass index is 31.48 kg/m .  GENERAL: NAD.  HEENT: NC/AT.   CV: RRR. S1, S2.   NECK: No bruits.  PULM: Non-labored breathing.   Neurologic:  MENTAL STATUS: Alert, attentive. Speech is fluent. Normal comprehension. Normal concentration. Adequate fund of knowledge.   CRANIAL NERVES: Discs flat. Visual fields intact to confrontation. Pupils equally, round and reactive to light. Facial sensation and movement normal. EOM full. Hearing intact to conversation. Sternocleidomastoids and trapezius strength intact. Palate moves symmetrically. Tongue midline.  MOTOR: 5/5 in proximal and distal muscle groups of upper and lower extremities. Tone and bulk normal.   DTRs: Intact and symmetric in biceps, BR, patellae.  Babinski down-going bilaterally.   SENSATION: Normal light touch and pinprick. Intact proprioception at great toes. Vibration: Normal at both ankles.   COORDINATION: Normal finger nose finger. Finger tapping normal. Knee heel shin normal.  STATION AND GAIT: Romberg Negative. Good postural reflexes. Casual gait and tandem normal.    Relevant Data:  MRI Brain / MRA Head and Neck (12.22.21):  IMPRESSION:  HEAD MRI:   1.  Patchy areas of restricted diffusion are seen within the medial aspect of the left cerebral hemisphere demonstrating an MCA MAYO watershed type distribution. No finding for hemorrhagic transformation.     2.  Mild paranasal sinus mucosal thickening with right maxillary sinus dependent air-fluid level compatible with active sinus disease.     HEAD MRA:   1.  Exam is degraded by motion artifact.     2.  Short segment severe stenosis M1 segment of the left MCA.     3.  Allowing for artifact, more distal middle and anterior cerebral arteries are patent.     NECK " MRA:  1.  No significant stenosis either cervical carotid or vertebral system. No findings for dissection.    CT Head / CTA / CT Perfusion (12.24.21):  Impression:   1. Evolving subacute infarctions in the left MCA distribution. No  acute cerebral infarct.  2. Suspected acute to subacute left pontine infarct.  3. Mild stenosis of the left M1 MCA segment, previously severe.   4. No stenosis of the cervical arteries.  5. No acute intracranial hemorrhage.    Imaging reviewed independently by me.  Agree with radiology read.    ASSESSMENT and PLAN:                                                      Assessment:     ICD-10-CM    1. Left MCA-MAYO watershed infarction  I63.89 Adult Neurology Referral     Homocysteine     Protein C chromogenic     Protein S Antigen Free     Factor 5 leiden mutation analysis     Anticardiolipin Ab, IgG/M, Qn ([facility])     Antithrombin III     F2 prothrombin 33099G Mut Anal     CRP inflammation     ESR: Erythrocyte sedimentation rate     Occupational Therapy Referral   2. Sequelae, post-stroke  I69.30        Ms. Ordoñez is a pleasant 36-year-old woman here to establish care for recent watershed infarcts.  Etiology unknown.  There is some question about coagulopathy related to COVID-19 vaccination, versus underlying hypercoagulable condition.  I have started the work-up for the latter, and the patient does plan to follow-up with hematology this spring as well.  Fortunately, she is feeling almost back to normal in terms of her physical health, but she continues to have problems from a cognitive standpoint.  I would like to have her do some cognitive rehabilitation and see how things go.  In the meantime I think it is reasonable for her to wait on going back to work.  We will fill out her disability paperwork to reflect this.  I would like to see Samia back in clinic again in a few months.  She expressed understanding and agreement with the plan.    Plan:  -- Additional labs to look for hyper  coagulopathy.  We will notify you of the results.  -- Continue the Brilinta and aspirin for a few more days and then switch to aspirin monotherapy thereafter.  -- Referral to Occupational Therapy for cognitive rehabilitation.  -- I will work on your disability forms and get those out tomorrow.  -- Return to Neurology clinic in 3 months.  Please let us know if any concerns arise in the meantime.    Total Time: 45 minutes were spent with the patient and in chart review/documentation (as described above in Assessment and Plan) /coordinating the care on date of service.    Sarah Ca MD  Neurology    CC: Marylou Lopez MD    Dragon software used in the dictation of this note.        Again, thank you for allowing me to participate in the care of your patient.        Sincerely,        Sarah Ca MD

## 2022-01-10 NOTE — PROGRESS NOTES
INITIAL NEUROLOGY CONSULTATION    DATE OF VISIT: 1/10/2022  MRN: [id number]  PATIENT NAME: Samia Ordoñez  YOB: 1985    REFERRING PROVIDER: Marylou Lopez    Chief Complaint   Patient presents with     New Patient     Stroke on December 22, 2021       SUBJECTIVE:                                                      HPI:   Samia Ordoñez is a 36 year old female whom I have been asked by Dr. Lopez to see in consultation for stroke.  Per chart review Samia was hospitalized at [facility] 12.23-12.24.21 for left-sided MCA and MAYO watershed infarcts, felt to be embolic in etiology.  She has a history of migraines and presented to [facility] after she developed numbness and tingling in the right hand.  She was also noticing some clumsiness and neglect on that side.  Imaging revealed left M1 stenosis and the strokes.  She was transferred to the [facility] for further work-up.  She was also experiencing throbbing frontal headaches at the time.  Hemoglobin A1c was 5.3.  LDL was 151, and blood pressures 140s systolic.  TTE was unremarkable.  aPTT normal, negative beta-2 microglobulin and lupus panel.  RIRI was completed as outpatient, results pending.  It was also recommended that she see hematology for hypercoagulopathy work-up as outpatient.  This is scheduled for April.  She was discharged on aspirin and Brilinta, as well as Lipitor.  Plan is for transition to full-strength aspirin on 1.15.22.    Her father had strokes. She had one miscarriage.  She had a healthy child thereafter, though he was born a little early at 27 weeks.    She feels mostly back to normal.  She still feels cognitively foggy and the harder she tries to think the more foggy she becomes.  She has been trying to do puzzles on her computer, games to keep her mind sharp.  She does not feel that she could work at this point.  She also asks about driving.  Her numbness in the hand has completely resolved.  She does notice that her  "peripheral vision in the right eye is improving.    She did have a RIRI completed, and says that as far she knows the results are normal.  It is not clear why these are not available to review.  She is not supposed to follow-up with cardiology.    No past medical history on file.  No past surgical history on file.    aspirin (ASA) 81 MG chewable tablet, Take 1 tablet (81 mg) by mouth daily for 20 days  atorvastatin (LIPITOR) 40 MG tablet, Take 1 tablet (40 mg) by mouth every evening  ticagrelor (BRILINTA) 90 MG tablet, Take 1 tablet (90 mg) by mouth 2 times daily for 20 days  albuterol (PROAIR HFA/PROVENTIL HFA/VENTOLIN HFA) 108 (90 Base) MCG/ACT inhaler, Inhale 2 puffs into the lungs every 6 hours as needed for shortness of breath / dyspnea or wheezing (Patient not taking: Reported on 1/10/2022)  [START ON 1/15/2022] aspirin (ASA) 325 MG tablet, Take 1 tablet (325 mg) by mouth daily  naproxen (NAPROSYN) 500 MG tablet, Take 500 mg by mouth 2 times daily as needed for moderate pain (Patient not taking: Reported on 1/10/2022)    No current facility-administered medications on file prior to visit.    Allergies   Allergen Reactions     Sumatriptan      Imitrex      Adhesive Tape Rash     Sulfamethoxazole-Trimethoprim Rash        Problem (# of Occurrences) Relation (Name,Age of Onset)    Cerebrovascular Disease (1) Father        Social History     Tobacco Use     Smoking status: Former Smoker     Smokeless tobacco: Never Used   Substance Use Topics     Alcohol use: Yes     Comment: socially      Drug use: None       REVIEW OF SYSTEMS:                                                      10-point review of systems is negative except as mentioned above in HPI.     EXAM:                                                      Physical Exam:   Vitals: /79 (BP Location: Right arm, Patient Position: Sitting)   Pulse 67   Ht 1.626 m (5' 4\")   Wt 83.2 kg (183 lb 6.4 oz)   BMI 31.48 kg/m    BMI= Body mass index is 31.48 " kg/m .  GENERAL: NAD.  HEENT: NC/AT.   CV: RRR. S1, S2.   NECK: No bruits.  PULM: Non-labored breathing.   Neurologic:  MENTAL STATUS: Alert, attentive. Speech is fluent. Normal comprehension. Normal concentration. Adequate fund of knowledge.   CRANIAL NERVES: Discs flat. Visual fields intact to confrontation. Pupils equally, round and reactive to light. Facial sensation and movement normal. EOM full. Hearing intact to conversation. Sternocleidomastoids and trapezius strength intact. Palate moves symmetrically. Tongue midline.  MOTOR: 5/5 in proximal and distal muscle groups of upper and lower extremities. Tone and bulk normal.   DTRs: Intact and symmetric in biceps, BR, patellae.  Babinski down-going bilaterally.   SENSATION: Normal light touch and pinprick. Intact proprioception at great toes. Vibration: Normal at both ankles.   COORDINATION: Normal finger nose finger. Finger tapping normal. Knee heel shin normal.  STATION AND GAIT: Romberg Negative. Good postural reflexes. Casual gait and tandem normal.    Relevant Data:  MRI Brain / MRA Head and Neck (12.22.21):  IMPRESSION:  HEAD MRI:   1.  Patchy areas of restricted diffusion are seen within the medial aspect of the left cerebral hemisphere demonstrating an MCA MAYO watershed type distribution. No finding for hemorrhagic transformation.     2.  Mild paranasal sinus mucosal thickening with right maxillary sinus dependent air-fluid level compatible with active sinus disease.     HEAD MRA:   1.  Exam is degraded by motion artifact.     2.  Short segment severe stenosis M1 segment of the left MCA.     3.  Allowing for artifact, more distal middle and anterior cerebral arteries are patent.     NECK MRA:  1.  No significant stenosis either cervical carotid or vertebral system. No findings for dissection.    CT Head / CTA / CT Perfusion (12.24.21):  Impression:   1. Evolving subacute infarctions in the left MCA distribution. No  acute cerebral infarct.  2. Suspected  acute to subacute left pontine infarct.  3. Mild stenosis of the left M1 MCA segment, previously severe.   4. No stenosis of the cervical arteries.  5. No acute intracranial hemorrhage.    Imaging reviewed independently by me.  Agree with radiology read.    ASSESSMENT and PLAN:                                                      Assessment:     ICD-10-CM    1. Left MCA-MAYO watershed infarction  I63.89 Adult Neurology Referral     Homocysteine     Protein C chromogenic     Protein S Antigen Free     Factor 5 leiden mutation analysis     Anticardiolipin Ab, IgG/M, Qn ([facility])     Antithrombin III     F2 prothrombin [id number] Mut Anal     CRP inflammation     ESR: Erythrocyte sedimentation rate     Occupational Therapy Referral   2. Sequelae, post-stroke  I69.30        Ms. Ordoñez is a pleasant 36-year-old woman here to establish care for recent watershed infarcts.  Etiology unknown.  There is some question about coagulopathy related to COVID-19 vaccination, versus underlying hypercoagulable condition.  I have started the work-up for the latter, and the patient does plan to follow-up with hematology this spring as well.  Fortunately, she is feeling almost back to normal in terms of her physical health, but she continues to have problems from a cognitive standpoint.  I would like to have her do some cognitive rehabilitation and see how things go.  In the meantime I think it is reasonable for her to wait on going back to work.  We will fill out her disability paperwork to reflect this.  I would like to see Samia back in clinic again in a few months.  She expressed understanding and agreement with the plan.    Plan:  -- Additional labs to look for hyper coagulopathy.  We will notify you of the results.  -- Continue the Brilinta and aspirin for a few more days and then switch to aspirin monotherapy thereafter.  -- Referral to Occupational Therapy for cognitive rehabilitation.  -- I will work on your disability forms and  get those out tomorrow.  -- Return to Neurology clinic in 3 months.  Please let us know if any concerns arise in the meantime.    Total Time: 45 minutes were spent with the patient and in chart review/documentation (as described above in Assessment and Plan) /coordinating the care on date of service.    Sarah Ca MD  Neurology    CC: Marylou Lopez MD    Dragon software used in the dictation of this note.

## 2022-01-10 NOTE — NURSING NOTE
Chief Complaint   Patient presents with     New Patient     Stroke on December 22, 2021     Gilson Strauss CMA@ on 1/10/2022 at 2:48 PM

## 2022-01-10 NOTE — PATIENT INSTRUCTIONS
Plan:  -- Additional labs to look for hyper coagulopathy.  We will notify you of the results.  -- Continue the Brilinta and aspirin for a few more days and then switch to aspirin monotherapy thereafter.  -- Referral to Occupational Therapy for cognitive rehabilitation.  -- I will work on your disability forms and get those out tomorrow.  -- Return to Neurology clinic in 3 months.  Please let us know if any concerns arise in the meantime.

## 2022-01-12 ENCOUNTER — MYC MEDICAL ADVICE (OUTPATIENT)
Dept: NEUROLOGY | Facility: CLINIC | Age: 37
End: 2022-01-12
Payer: COMMERCIAL

## 2022-01-13 ENCOUNTER — TELEPHONE (OUTPATIENT)
Dept: NEUROLOGY | Facility: CLINIC | Age: 37
End: 2022-01-13

## 2022-01-13 ENCOUNTER — HOSPITAL ENCOUNTER (OUTPATIENT)
Dept: OCCUPATIONAL THERAPY | Facility: REHABILITATION | Age: 37
End: 2022-01-13
Attending: PSYCHIATRY & NEUROLOGY
Payer: COMMERCIAL

## 2022-01-13 ENCOUNTER — TELEPHONE (OUTPATIENT)
Dept: OPHTHALMOLOGY | Facility: CLINIC | Age: 37
End: 2022-01-13

## 2022-01-13 DIAGNOSIS — Z78.9 IMPAIRED INSTRUMENTAL ACTIVITIES OF DAILY LIVING: ICD-10-CM

## 2022-01-13 DIAGNOSIS — H53.9 VISION CHANGES: ICD-10-CM

## 2022-01-13 DIAGNOSIS — Z86.73 HISTORY OF STROKE: Primary | ICD-10-CM

## 2022-01-13 DIAGNOSIS — I63.9 INCOORDINATION DUE TO ACUTE STROKE (H): Primary | ICD-10-CM

## 2022-01-13 DIAGNOSIS — R41.89 COGNITIVE CHANGES: ICD-10-CM

## 2022-01-13 DIAGNOSIS — R27.9 INCOORDINATION DUE TO ACUTE STROKE (H): Primary | ICD-10-CM

## 2022-01-13 PROCEDURE — 97112 NEUROMUSCULAR REEDUCATION: CPT | Mod: GO | Performed by: OCCUPATIONAL THERAPIST

## 2022-01-13 PROCEDURE — 97165 OT EVAL LOW COMPLEX 30 MIN: CPT | Mod: GO | Performed by: OCCUPATIONAL THERAPIST

## 2022-01-13 NOTE — TELEPHONE ENCOUNTER
Please let Samia know that I put in a referral for neuro-ophthalmology consultation, per the request of the occupational therapist.    Thank you,  Dr. Ca

## 2022-01-13 NOTE — PROGRESS NOTES
01/13/22 1200   Quick Adds   Type of Visit Initial Outpatient Occupational Therapy Evaluation   General Information   Start Of Care Date 01/13/22   Referring Physician Dr. Sarah Ca   Orders Evaluate and treat as indicated   Orders Date 01/10/22   Medical Diagnosis MCA and MAYO infarct   Onset of Illness/Injury or Date of Surgery 12/21/21   Precautions/Limitations No known precautions/limitations   Surgical/Medical History Reviewed Yes   Additional Occupational Profile Info/Pertinent History of Current Problem Patient presents with cognitive and vision changes since she was hospitalized for a stroke. Per EHR(neurology visit on 1-10-22), Samia was hospitalized at [facility] 12.23-12.24.21 for left-sided MCA and MAYO watershed infarcts, felt to be embolic in etiology.  She has a history of migraines and presented to [facility] after she developed numbness and tingling in the right hand.  She was also noticing some clumsiness and neglect on that side.  Imaging revealed left M1 stenosis and the strokes.  She was transferred to the [facility] for further work-up.  She was also experiencing throbbing frontal headaches at the time.    Role/Living Environment   Patient role/Employment history Employed   Current Living Environment House   Prior Level - Transfers Independent   Prior Level - Ambulation Independent   Prior Level - ADLS Independent   Prior Responsibilities - IADL Meal Preparation;Housekeeping;Laundry;Shopping;Medication management;Finances;Driving   Role/Living Environment Comments Patient lives with her  and 14 year old son in split level home. Patient works full time a supplier analyst for large company. Patient is out of work on short term disability for now.   Pain   Patient currently in pain No   Fall Risk Screen   Fall screen completed by OT   Have you fallen 2 or more times in the past year? No   Have you fallen and had an injury in the past year? No   Is patient a fall  risk? No   Abuse Screen (yes response referral indicated)   Feels Unsafe at Home or Work/School no   Feels Threatened by Someone no   Does Anyone Try to Keep You From Having Contact with Others or Doing Things Outside Your Home? no   Physical Signs of Abuse Present no   Cognitive Status Examination   Orientation Orientation to person, place and time   Level of Consciousness Alert   Follows Commands and Answers Questions 100% of the time   Attention Reports problems attending   Organization/Problem Solving Reports problems with organization   Cognitive Comment MoCA score is 28/30 today.   Visual Perception   Visual Perception Comments to be assessed at follow up visits   Sensation   Sensation Comments patient reports normal sensation   Range of Motion (ROM)   ROM Comments Bilateral UE AROM is WNL   Strength   Strength Comments Bilateral UE strength is WNL   Hand Strength   Hand Dominance Right   Left Hand  (pounds) 66 pounds   Right Hand  (pounds) 61 pounds   Left Lateral Pinch (pounds) 15 pounds   Right Lateral Pinch (pounds) 17 pounds   Left Three Point Pinch (pounds) 12 pounds   Right Three Point Pinch (pounds) 16 pounds   Hand Strength Comments Patient was in PT for right shoulder and hand pain and weakness with unclear etiology. possibly rotator cuff issue   Coordination   Left Hand, Nine Hole Peg Test (seconds) 22.5 sec   Right Hand, Nine Hole Peg Test (seconds) 22.0 sec   Functional Mobility   Functional Mobility Comments Patient reports no deficits at this time   Transfer Skill   Level of [place]: Transfers independent   Bathing   Level of [place] - Bathing independent   Upper Body Dressing   Level of [place]: Dress Upper Body independent   Lower Body Dressing   Level of [place]: Dress Lower Body independent   Toileting   Level of [place]: Toilet independent   Grooming   Level of [place]: Grooming independent   Eating/Self-Feeding   Level of [place]: Eating  independent   Activity Tolerance   Activity Tolerance Patient reports no deficits at this time   Instrumental Activities of Daily Living Assessment   IADL Assessment/Observations Patient used to manage the household finances and has not been able to do that. She and her  share the cooking duties and she is not able to do this as much as she did prior to the stroke. Does not feel comfotable chopping for meal prep. She is doing the cleaning and her  has always done the laundry. Patient is taking her own medications however her  sets up the weekly medication box.    Planned Therapy Interventions   Planned Therapy Interventions IADL training;Coordination training;Neuromuscular re-education;Therapeutic activities;Self care/Home management;Strengthening    OT Goal 1   Goal Description Patient will be able to return to work at least part time by the end of February.    Target Date 03/14/22    OT Goal 2   Goal Description Patient will be able to manage household finances independently using compensatory strategies for organization    Target Date 03/14/22    OT Goal 3   Goal Description Patient will be able to prepare full meal independently using strategies to organize task(grocery list, set up and timing)   Target Date 03/14/22   OT Goal 4   Goal Description Patient will be able to order, set up and take her medications independently using reminders   Target Date 03/14/22   Clinical Impression   Criteria for Skilled Therapeutic Interventions Met Yes, treatment indicated   OT Diagnosis cognitive changes, decreased ADL and IADL function, right UE incoordination, Vision changes   Clinical Decision Making (Complexity) Low complexity   Therapy Frequency once a week   Predicted Duration of Therapy Intervention (days/wks) 12 weeks   Risks and Benefits of Treatment have been explained. Yes   Patient, Family & other staff in agreement with plan of care Yes   Clinical Impression Comments Patient would benefit  from neuro-opthalmologist visit. She is having difficulty with organizing tasks and feeling like routine tasks overwhelm her. Patient would benefit from OT for stated goals.   Education Assessment   Barriers To Learning No Barriers   Total Evaluation Time   OT Gabrielle, Low Complexity Minutes ([id number]) 45

## 2022-01-13 NOTE — TELEPHONE ENCOUNTER
[facility] Call Center    Phone Message    May a detailed message be left on voicemail: no     Reason for Call: Appointment Intake    Referring Provider Name: AUSTIN PERKINS  Diagnosis and/or Symptoms: History of stroke    Additional Info: Neuro ophtho requested, for patient with recent strokes    I am not showing this diagnosis in our protocols.     Please review    Action Taken: Other: ENT    Travel Screening: Not Applicable

## 2022-01-13 NOTE — TELEPHONE ENCOUNTER
We have no records in the right fax only what's in her chart.     Should we wait to schedule until we receive more records?    Thank You,     Margo

## 2022-01-14 ENCOUNTER — TELEPHONE (OUTPATIENT)
Dept: OPHTHALMOLOGY | Facility: CLINIC | Age: 37
End: 2022-01-14
Payer: COMMERCIAL

## 2022-01-14 NOTE — TELEPHONE ENCOUNTER
Called and spoke to Samia     Made her an appointment for 2/23 @ 9 am with Dr. Ramirez. I mailed out a new pt packet with time, date and a map.     I also added pt to the wait list     Margo Anderson Communication Facilitator on 1/14/2022 at 11:05 AM

## 2022-01-19 ENCOUNTER — TELEPHONE (OUTPATIENT)
Dept: NEUROLOGY | Facility: CLINIC | Age: 37
End: 2022-01-19
Payer: COMMERCIAL

## 2022-01-19 DIAGNOSIS — Z86.73 HISTORY OF STROKE: ICD-10-CM

## 2022-01-19 DIAGNOSIS — D68.59 PRIMARY HYPERCOAGULABLE STATE (H): Primary | ICD-10-CM

## 2022-01-19 NOTE — TELEPHONE ENCOUNTER
Please let Samia know that one of her blood clotting labs came back abnormal.  This indicates that she might carry a gene that would make her more susceptible to thrombosis.  I am going to refer her to a blood specialist/hematology for help with decision-making about blood thinners going forward.    Thank you,  Dr. Ca

## 2022-01-19 NOTE — TELEPHONE ENCOUNTER
Relayed the results of pt's labs to her. She denied having any questions or concerns at this time. Pt stated that she does have a follow up with hematology in April.     Robina Chiu RN on 1/19/2022 at 9:21 AM

## 2022-02-01 ENCOUNTER — HOSPITAL ENCOUNTER (OUTPATIENT)
Dept: OCCUPATIONAL THERAPY | Facility: REHABILITATION | Age: 37
End: 2022-02-01
Payer: COMMERCIAL

## 2022-02-01 DIAGNOSIS — H53.9 VISION CHANGES: ICD-10-CM

## 2022-02-01 DIAGNOSIS — R41.89 COGNITIVE CHANGES: ICD-10-CM

## 2022-02-01 DIAGNOSIS — R27.9 INCOORDINATION DUE TO ACUTE STROKE (H): ICD-10-CM

## 2022-02-01 DIAGNOSIS — I63.9 ACUTE ISCHEMIC STROKE (H): Primary | ICD-10-CM

## 2022-02-01 DIAGNOSIS — Z78.9 IMPAIRED INSTRUMENTAL ACTIVITIES OF DAILY LIVING: ICD-10-CM

## 2022-02-01 DIAGNOSIS — I63.9 INCOORDINATION DUE TO ACUTE STROKE (H): ICD-10-CM

## 2022-02-01 PROCEDURE — 97535 SELF CARE MNGMENT TRAINING: CPT | Mod: GO | Performed by: OCCUPATIONAL THERAPIST

## 2022-02-01 PROCEDURE — 97112 NEUROMUSCULAR REEDUCATION: CPT | Mod: GO | Performed by: OCCUPATIONAL THERAPIST

## 2022-02-05 ENCOUNTER — HEALTH MAINTENANCE LETTER (OUTPATIENT)
Age: 37
End: 2022-02-05

## 2022-02-08 ENCOUNTER — HOSPITAL ENCOUNTER (OUTPATIENT)
Dept: SPEECH THERAPY | Facility: REHABILITATION | Age: 37
End: 2022-02-08
Attending: PSYCHIATRY & NEUROLOGY
Payer: COMMERCIAL

## 2022-02-08 DIAGNOSIS — I63.9 ACUTE ISCHEMIC STROKE (H): ICD-10-CM

## 2022-02-08 PROCEDURE — 92523 SPEECH SOUND LANG COMPREHEN: CPT | Mod: GN | Performed by: SPEECH-LANGUAGE PATHOLOGIST

## 2022-02-08 PROCEDURE — 92507 TX SP LANG VOICE COMM INDIV: CPT | Mod: GN | Performed by: SPEECH-LANGUAGE PATHOLOGIST

## 2022-02-08 NOTE — PROGRESS NOTES
"   02/08/22 1000   General Information   Type of Evaluation Cognitive-Linguistic   Type Of Visit Initial   Start Of Care Date 02/08/22   Referring Physician Stefano Moran   Medical Diagnosis Acute ischemic stroke I63.9   Onset Of Illness/injury Or Date Of Surgery 12/22/21   Precautions/Limitations  no known precautions/limitations   Hearing WFL   Surgical/Medical history reviewed Yes   Pertinent History Of Current Problem Patient is a 36 year old female who presents with word finding difficulties post CVA on 12/22/21.  Per OT evaluation, \"Samia was hospitalized at [facility] 12.23-12.24.21 for left-sided MCA and MAYO watershed infarcts, felt to be embolic in etiology.  She has a history of migraines and presented to [facility] after she developed numbness and tingling in the right hand.  She was also noticing some clumsiness and neglect on that side.  Imaging revealed left M1 stenosis and the strokes.  She was transferred to the [facility] for further work-up.  She was also experiencing throbbing frontal headaches at the time.\"  Patient reported that initially she did not have difficulty with word finding and this started on January 17th.  She noted that she has been doing puzzles and typing for 30 minutes daily which have both been better/easier the more she does them.  Patient noted some difficulty noticing the 'l' and 'p' on her keyboard \"unless I really focus\".  She will see a neuro opthamologist later this month.     Prior Level Of Function Comment Independent, worked full time as an analyst   Patient Role/employment History Other/comments  (currently on short term disability)   Living environment House/[facility]  (with  and 14 year old son)   General Observations Patient engaged and motivated during evaluation.   Patient/family Goals \"speech improvement\"   Fall Risk Screen   Fall screen completed by SLP   Have you fallen 2 or more times in the past year? No   Have you fallen and had an injury " "in the past year? No   Is patient a fall risk? No   Abuse Screen (yes response referral indicated)   Feels Unsafe at Home or Work/School no   Feels Threatened by Someone no   Does Anyone Try to Keep You From Having Contact with Others or Doing Things Outside Your Home? no   Physical Signs of Abuse Present no   Speech   Deficits in Speech Respiration None   Deficits in Phonation None   Deficits in Articulation None   Speech Comments Patient's speech is 100% intelligible at conversational level.  Patient noted x1 instance of changing syllables in a word, and endorsed some difficulty with phonemic errors.  She gave the example of attempting to say \"publish\" and that it may come out a \"lupish\".   Language: Auditory Comprehension (understanding of spoken language)   Comments (Auditory Comprehension) Not formally assessed.  Patient able to follow directions and respond appropriately to questions during evaluation.   Language: Verbal Expression (use of spoken language to express information)   Comments (Verbal Expression) The [place] Naming Test was administered to assess confrontation naming.  Patient able to name 59/60 pictures SAMUEL.  Patient able to provide semantically correct and complete definition to concrete nouns in 1/2 opportunities SAMUEL, increased to 2/2 given prompt to add additional details.  Patient able to provide semantically correct and complete definition to abstract concepts in 1/2 attempts SAMUEL.  Patient able to verbally explain/describe directions to get home with specific and correct wording.  Patient demonstrated frequent pauses/hesitations during conversation which I suspect are r/t word finding and expressive language coordination.   Written Expression (use of writing to express information)   Comments (Written Expression) Patient able to write name and address with 100% accuracy.  Patient able to write words to dictation with 100% accuracy.  Patient able to write 3 sentence paragraph to describe her " home with x3 self corrections (x2 letters and x1 word).  Patient endorsed increased difficulty as complexity increases.  Patient able to type her short paragraph with 100% accuracy, notable for reduced rate and >10 self-corrections.   Cognitive Status Examination   Standardized cognitive-linguistic assessment completed yes;CLQT   Cognitive Status Exam Comments The Cognitive Linguistic Quick Test was administered.  Mild deficits noted per memory.  See attached progress note.   General Therapy Interventions   Planned Therapy Interventions Cognitive Treatment;Language   Cognitive treatment Internal memory strategy training;External memory strategy training   Language Written expression;Verbal expression   Clinical Impression, SLP Eval   Criteria for Skilled Therapeutic Interventions Met (SLP Eval) yes;treatment indicated   SLP Diagnosis Aphasia, Cognitive Communication Deficit   Predicted Duration of Therapy Intervention (days/wks) 1x per week for up to 10 visits   Risks and Benefits of Treatment have been explained. Yes   Patient, Family & other staff in agreement with plan of care Yes   Clinical Impression Comments Patient presents with at least mild expressive aphasia in both verbal and written format, more notable in conversation and complex language demands vs simple naming tasks.  Patient scored WNL for overall cognitive-linguistic abilities per standardizes assessment completed on today's date, with mild deficits per individual memory subtests.  Patient is appropriate for direct speech therapy to initiate cognitive-linguistic rehabilitation and compensatory strategies to support functional participation in daily tasks.   Cognitive/Communication Goal 1   Goal Identifier 1   Goal Description Patient will learn and demonstrate use of x3 word finding strategies in conversation by end of POC.   Target Date 05/08/22   Cognitive/Communication Goal 2   Goal Identifier 2   Goal Description Patient will complete high  sentence level expressive language tasks with semantically correct and complete wording in >90% of opportunities.   Target Date 05/08/22   Cognitive/Communication Goal 3   Goal Identifier 3   Goal Description Patient will learn and demonstrate use of x2 cognitive-linguistic compensatory strategies by end of POC.   Target Date 05/08/22   Cognitive/Communication Goal 4   Goal Identifier 4   Goal Description Patient will complete high level memory tasks with >90% accuracy with use of compensatory strategies.   Target Date 05/08/22   Total Session Time   Sound production with lang comprehension and expression minutes (99022) 50   Total Evaluation Time 50  (15 intervention)     Speech Language Pathology     Cognitive Linguistic Quick Test (CLQT)    SUMMARY OF TEST:    The CLQT assesses visual attention and perception, working memory and language output skills, as well as auditory memory and comprehension.  Non-linguistic tasks can help assess planning, and self-monitoring, visual discrimination and analysis, as well as creativity and mental flexibility.   Together, these subtests assess the cognitive domains of attention, memory, executive function, language, and visuospatial skills using a severity rating of either WNL (within normal limits), Mild, Moderate or Severe.    RESULTS OF TESTING:   Attention    Score: 205    Severity Rating: WNL    Memory    Score: 147    Severity Rating: Mild    Executive Functions    Score: 30    Severity Rating: WNL    Language    Score: 32    Severity Rating: WNL   Visuospatial Skills    Score: 95    Severity Rating: WNL    Composite Severity Rating    Score: 3.8    Severity Rating: WNL    Clock Drawing     Score: Not administered    Severity Rating: NA    INTERPRETATION OF TEST RESULTS: Patient demonstrated skills WNL on overall cognitive-linguistic evaluation.  Mild deficits noted per individual memory domain.  Patient demonstrated strengths in symbol cancellation, confrontation naming,  story retelling, symbol trains, mazes, and design generation tasks.  Difficulty noted with generative naming with use of multiple forms of same word (e.g., darnell, marks, marked) despite directions not to.    TIME ADMINISTERING TEST: 25  TIME FOR INTERPRETATION AND PREPARATION OF REPORT: 20  TOTAL TIME: 45  Reference:  Maikol Alejo, CCC-SLP, (2001) [employer]

## 2022-02-15 ENCOUNTER — HOSPITAL ENCOUNTER (OUTPATIENT)
Dept: SPEECH THERAPY | Facility: REHABILITATION | Age: 37
End: 2022-02-15
Payer: COMMERCIAL

## 2022-02-15 DIAGNOSIS — R41.841 COGNITIVE COMMUNICATION DEFICIT: ICD-10-CM

## 2022-02-15 DIAGNOSIS — R47.01 APHASIA: Primary | ICD-10-CM

## 2022-02-15 PROCEDURE — 92507 TX SP LANG VOICE COMM INDIV: CPT | Mod: GN | Performed by: SPEECH-LANGUAGE PATHOLOGIST

## 2022-02-15 NOTE — PROGRESS NOTES
" 02/15/22 1000   Signing Clinician's Name / Credentials   Signing clinician's name /credentials Pastora Green M.S. CCC-SLP   Session Number   Session Number 2/11   Progress/Recertification   Progress note due 04/08/22   Recertification Due 05/08/22   Subjective Report   Subjective Report Patient arrived to therapy in good spirits.  She expressed that she is doing \"pretty good\" but does get irritated when unable to find a word.  She noted that she has been playing games with her son that involve language/word finding.  Patient noted she is supposed to return to work on Tuesday and that she does not yet feel ready.   Cognitive/Communication Goal 1   Goal Identifier 1   Goal Description Patient will learn and demonstrate use of x3 word finding strategies in conversation by end of POC.   Target Date 05/08/22   Cognitive/Communication Goal 2   Goal Identifier 2   Goal Description Patient will complete high sentence level expressive language tasks with semantically correct and complete wording in >90% of opportunities.   Target Date 05/08/22   Cognitive/Communication Goal 3   Goal Identifier 3   Goal Description Patient will learn and demonstrate use of x2 cognitive-linguistic compensatory strategies by end of POC.   Target Date 05/08/22   Cognitive/Communication Goal 4   Goal Identifier 4   Goal Description Patient will complete high level memory tasks with >90% accuracy with use of compensatory strategies.   Target Date 05/08/22   Treatment Speech/Lang/Voice   Treatment of Speech, Language, Voice Communication&/or Auditory Processing (10617) 55 Minutes   Skilled Intervention Provided written and verbal information on.;Modeled compensatory strategies;Provided feedback on performance of tasks   Patient Response Patient receptive to word finding strategies and expressive language organizational strategies.   Treatment Detail Trained patient on x6 word finding strategies (i.e., description, first letter, association, " category, substitution, and gestures).  Patient able to apply to concrete noun (i.e., broccoli) given min cues and models.  Patient able to utilize description strategy to provide a definition for x3 abstract concepts.  Patient able to describe abstract concept to unfamiliar SLP given min cueing with strategy use in 3/3 trials.  Patient able to guess abstract concept given cues in 3/3 trials.  Patient trained on organizational strategies for high level description (e.g., describing meaning/use of expressions).  Patient able to summarize meaning in 5/5, notable for vague language/short utterances.  Able to expand verbal description given cues and context.  Modeled visual organizational strategies with thesaurus concept to incorporate substation strategy.  Patient able to write sentence to describe expression x1 given additional time for language formulation/writing.  Patient continues to practice typing daily with copying short story/multiple paragraphs.  Recommended patient incorporate self-generated expression via summarizing after copying, or typing definitions/sentences of abstract concepts/expressions provided.   Education   Learner Patient   Readiness Acceptance   Method Explanation;Demonstration   Response Verbalizes understanding   Plan   Homework definitions, expressions, written language   Plan for next session w.f. and cog strategies, high level expression, compensatory strategies for return to work   Comments   Comments Patient receptive and demonstrates benefit from word finding strategies and organizationalexpressive language strategies, but continues to require additional time for language formulation and cues for increased specificity.  Patient would benefit from additional therapy to address language concerns and identify and train compensatory strategies for daily tasks at home and work environment prior to returning to work.  When patient returns to work, she would benefit from gradual return (half  days, 2-3 days per week initially).   Total Session Time   Total Treatment Time (sum of timed and untimed services) 55   AMBULATORY CLINICS ONLY-MEDICAL AND TREATMENT DIAGNOSIS   Medical Diagnosis Acute ischemic stroke I63.9   SLP Diagnosis Aphasia, Cognitive Communication Deficit

## 2022-02-17 ENCOUNTER — HOSPITAL ENCOUNTER (OUTPATIENT)
Dept: OCCUPATIONAL THERAPY | Facility: REHABILITATION | Age: 37
End: 2022-02-17
Payer: COMMERCIAL

## 2022-02-17 DIAGNOSIS — I63.9 ACUTE ISCHEMIC STROKE (H): ICD-10-CM

## 2022-02-17 DIAGNOSIS — R27.9 INCOORDINATION DUE TO ACUTE STROKE (H): Primary | ICD-10-CM

## 2022-02-17 DIAGNOSIS — H53.9 VISION CHANGES: ICD-10-CM

## 2022-02-17 DIAGNOSIS — R41.89 COGNITIVE CHANGES: ICD-10-CM

## 2022-02-17 DIAGNOSIS — Z78.9 IMPAIRED INSTRUMENTAL ACTIVITIES OF DAILY LIVING: ICD-10-CM

## 2022-02-17 DIAGNOSIS — I63.9 INCOORDINATION DUE TO ACUTE STROKE (H): Primary | ICD-10-CM

## 2022-02-17 PROCEDURE — 97535 SELF CARE MNGMENT TRAINING: CPT | Mod: GO | Performed by: OCCUPATIONAL THERAPIST

## 2022-02-23 ENCOUNTER — MYC MEDICAL ADVICE (OUTPATIENT)
Dept: NEUROLOGY | Facility: CLINIC | Age: 37
End: 2022-02-23

## 2022-02-23 ENCOUNTER — OFFICE VISIT (OUTPATIENT)
Dept: OPHTHALMOLOGY | Facility: CLINIC | Age: 37
End: 2022-02-23
Attending: OPHTHALMOLOGY
Payer: COMMERCIAL

## 2022-02-23 DIAGNOSIS — H53.10 SUBJECTIVE VISUAL DISTURBANCE: ICD-10-CM

## 2022-02-23 DIAGNOSIS — H50.52 EXOPHORIA: ICD-10-CM

## 2022-02-23 DIAGNOSIS — Z86.73 HISTORY OF CVA (CEREBROVASCULAR ACCIDENT): ICD-10-CM

## 2022-02-23 DIAGNOSIS — H53.461 RIGHT HOMONYMOUS INFERIOR QUADRANTANOPIA: Primary | ICD-10-CM

## 2022-02-23 PROCEDURE — 92133 CPTRZD OPH DX IMG PST SGM ON: CPT | Performed by: OPHTHALMOLOGY

## 2022-02-23 PROCEDURE — G0463 HOSPITAL OUTPT CLINIC VISIT: HCPCS | Mod: 25

## 2022-02-23 PROCEDURE — 92083 EXTENDED VISUAL FIELD XM: CPT | Performed by: OPHTHALMOLOGY

## 2022-02-23 PROCEDURE — 99204 OFFICE O/P NEW MOD 45 MIN: CPT | Mod: GC | Performed by: OPHTHALMOLOGY

## 2022-02-23 ASSESSMENT — SLIT LAMP EXAM - LIDS
COMMENTS: NORMAL
COMMENTS: NORMAL

## 2022-02-23 ASSESSMENT — VISUAL ACUITY
OS_CC: 20/20
OD_CC: 20/20
CORRECTION_TYPE: GLASSES
METHOD: SNELLEN - LINEAR

## 2022-02-23 ASSESSMENT — TONOMETRY
OD_IOP_MMHG: 9
OS_IOP_MMHG: 10
IOP_METHOD: ICARE

## 2022-02-23 ASSESSMENT — REFRACTION_WEARINGRX
OS_CYLINDER: +0.50
OS_AXIS: 112
OD_CYLINDER: +1.25
OD_SPHERE: -3.00
OD_AXIS: 064
OS_SPHERE: -1.25

## 2022-02-23 ASSESSMENT — CUP TO DISC RATIO
OD_RATIO: 0.2
OS_RATIO: 0.15

## 2022-02-23 ASSESSMENT — EXTERNAL EXAM - LEFT EYE: OS_EXAM: NORMAL

## 2022-02-23 ASSESSMENT — EXTERNAL EXAM - RIGHT EYE: OD_EXAM: NORMAL

## 2022-02-23 ASSESSMENT — CONF VISUAL FIELD
OS_NORMAL: 1
OD_INFERIOR_TEMPORAL_RESTRICTION: 3

## 2022-02-23 NOTE — PROGRESS NOTES
1. Right inferior homonymous quadrantanopia which correlates to recent left MCA/MAYO watershed cerebral vascular accident 12/22/21. Ocular exam otherwise unremarkable. 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 RIRI significant for small patent foramen ovale. Also has M1 segment severe stenosis of the left MCA on MRA imaging. Discussed with patient findings and recommendation to follow up with hematology, which is already scheduled for April 6th. Assessed already by Neurology for stroke and managed by Neurology for this.    Discussed with patient natural prognosis of visual field loss, which may or may not improve (mildly) in the next six months or so. Patient is still clearly meets the legal requirements to drive in [place].  Discussed that if the patient did not feel safe she could undergo an on the road driving evaluation but she states she does feel safe.       Patient may follow up as needed if any concerns or changing symptoms. May continue to follow up with her annual eye doctor for annual routine exams.     2. 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 Refractive error- 20/20 visual acuity with glasses correction today.    I did not make a follow-up appointment, but I would be happy to see the patient back in the future should any new neuro-ophthalmic concern arise.      Samia Ordoñez is a pleasant 36 year old White female who presents to my neuro-ophthalmology clinic today    HPI:    Patient experienced a left-sided MCA and MAYO watershed infarct felt to be embolic on December 22, 2021. She was hospitalized for 2 days on December 23 and December 24, 2021. Symptoms initially presented with numbness and tingling in the right hand as well as clumsiness on the right side. She has had some lingering cognitive dysfunction since stroke.    Patient has an evaluation with hematology appt in April. After her stroke she was discharged from the hospital on aspirin, Brilinta, and  Lipitor. Hypercoagulable labs thus far showing heterozygosity for Factor II gene mutation and elevated free protein S levels.     Patient reports a temporal peripheral visual field cut in the right eye since the stroke, which she thinks has been constant, but has been compensating for it with a slight head turn/dependance of the other eye. No eye pain, headache, facial droop, ptosis, diplopia, blurry vision, flashes, floaters, or redness. No ocular history. Last eye exam was within the last year, in April.     Independent historians:  Patient     Review of outside testin22 labs:  Elevated free protein S 144%  Heterozygous for Factor II gene mutation  Normal ATIII activity, protein C, cardiolipin, homocysteine, no factor V leiden, ESR and CRP    2021 labs:  Hemoglobin A1c 5.3, , transthoracic echo unremarkable, hypercoagulable work-up including APTT was normal and negative beta-2 microglobulin and lupus panel    Transesophageal echo was later performed 21 and showed a small patent foramen ovale is present, otherwise normal study.    MRI Brain / MRA Head and Neck (21):  IMPRESSION:  HEAD MRI:   1.  Patchy areas of restricted diffusion are seen within the medial aspect of the left cerebral hemisphere demonstrating an MCA MAYO watershed type distribution. No finding for hemorrhagic transformation.     2.  Mild paranasal sinus mucosal thickening with right maxillary sinus dependent air-fluid level compatible with active sinus disease.     HEAD MRA:   1.  Exam is degraded by motion artifact.     2.  Short segment severe stenosis M1 segment of the left MCA.     3.  Allowing for artifact, more distal middle and anterior cerebral arteries are patent.     NECK MRA:  1.  No significant stenosis either cervical carotid or vertebral system. No findings for dissection.     CT Head / CTA / CT Perfusion (21):  Impression:   1. Evolving subacute infarctions in the left MCA distribution. No  acute  cerebral infarct.  2. Suspected acute to subacute left pontine infarct.  3. Mild stenosis of the left M1 MCA segment, previously severe.   4. No stenosis of the cervical arteries.  5. No acute intracranial hemorrhage.     Review of outside clinical notes:  1/10/22 Dr. Batsheva Ca- Neurology    Past medical history:  See stroke history above  1 miscarriage  Migraines    Medications:  Aspirin 325 mg daily since January 15, 2022  Atorvastatin    Family history / social history:  Father suffered strokes around 53 and 57.    Exam:    Visual acuity is 20/20 both eyes, normal intraocular pressure, no relative afferent pupillary defect, brisk pupils, full color vision on ishihara plates. Anterior segment exam unremarkable. Healthy appearance of bilateral optic nerves and otherwise unremarkable fundoscopic dilated exam. Strabismus exam remarkable for mild exophoria on primary gaze, worse at near and on right gaze. Patient fuses this easily- no binocular diplopia.    Confrontation visual fields along horizontal meridian today were essentially full in both eyes.    Tests ordered and interpreted today:  OCT retinal nerve fiber layer essentially normal- borderline temporal retinal nerve fiber layer thinning in both eyes which still would be categorized as normal.    OVF today showing partial right inferior homonymous quadrantanopia which respects the vertical horizon.           Jessica Adams MD  PGY-3 Resident Physician  Department of Ophthalmology    45 minutes were spent on the date of the encounter by me doing chart review, history and exam, documentation, and further activities as noted above    Complete documentation of historical and exam elements from today's encounter can be found in the full encounter summary report (not reduplicated in this progress note).  I personally obtained the chief complaint(s) and history of present illness.  I confirmed and edited as necessary the review of systems, past medical/surgical  history, family history, social history, and examination findings as documented by others; and I examined the patient myself.  I personally reviewed the relevant tests, images, and reports as documented above.  I formulated and edited as necessary the assessment and plan and discussed the findings and management plan with the patient and family     Aba Ramirez MD

## 2022-02-23 NOTE — NURSING NOTE
No chief complaint on file.    Chief Complaint(s) and History of Present Illness(es)     Samia Ordoñez is a 36 year old female who presents today for  1. Left-sided MCA and MAYO watershed infarcts  12.23.2022  Describes missing temporal right field.   No diplopia.   Balance improved significantly in the first two weeks   Significant improvement since onset.       Narrative & Impression  EXAM: MR BRAIN W/O AND W CONTRAST, MRA NECK (CAROTIDS) WO AND W CONTRAST, MRA BRAIN ([place] OF [place]) WO CONTRAST  LOCATION: [facility]  DATE/TIME: 12/22/2021 1:24 PM     INDICATION: Onset yesterday of right hand numbness/coordination issues. Peripheral vision loss.  COMPARISON: None.  CONTRAST: 8 mL Gadavist (accession [id number]), 8 ml Gadavist (accession [id number]), 8 mL Gadavist IV.  TECHNIQUE:   1) Routine multiplanar multisequence head MRI without and with intravenous contrast.  2) 3D time-of-flight head MRA without intravenous contrast.  3) Neck MRA without and with IV contrast. Stenosis measurements made according to NASCET criteria unless otherwise specified.     FINDINGS:  HEAD MRI:  INTRACRANIAL CONTENTS: Patchy areas of FLAIR hyperintensity and restricted diffusion are seen within the left frontal parietal and occipital lobes demonstrating an MCA MAYO watershed type distribution and most evident along the left posterior parietal   region where the area of ischemia measures at least 2.3 x 2.6 cm in axial dimension. There is a small focus of restricted diffusion along the left corona radiata/upper margin left basal ganglia. No areas of abnormal parenchymal susceptibility. No areas   of abnormal enhancement.     Ventricles are normal in size. Otherwise normal gray-white matter differentiation. Although there is some gyriform swelling in the area of ischemia, no significant mass effect or midline shift.     Cerebellar tonsils are normally positioned. Visualized upper cervical spinal cord, sella,  and corpus callosum are unremarkable. Major skull base vascular flow-voids are preserved.     OSSEOUS STRUCTURES/SOFT TISSUES: Visualized marrow space is unremarkable.      ORBITS: No abnormality accounting for technique.      SINUSES/MASTOIDS: Mild paranasal sinus mucosal thickening with dependent air-fluid level right maxillary sinus suggesting active sinus disease. Middle ear cavities and mastoid air cells are clear.      HEAD MRA: Exam is at least mildly degraded by motion artifact.  ANTERIOR CIRCULATION: Intracranial internal carotid arteries are unremarkable. Although assessment of the middle cerebral arteries is degraded by motion artifact, there appears to be a short segment flow gap/filling defect within the M1 segment of the   left MCA spanning roughly 1.5 mm segment (axial source image 99). On henrry bolus MRA this corresponds to an area of apparent severe focal stenosis (source image 39 of series 18).     Best appreciated on the MIP images, there is a band of artifactual narrowing within the distal MCAs and ACAs. Otherwise there is grossly normal flow-related enhancement within the more distal left MCA, ACAs, and right middle cerebral artery.     POSTERIOR CIRCULATION: Left vertebral artery is slightly larger than the right. Vertebral and basilar arteries are unremarkable. Right posterior communicating artery is patent and provides majority of flow to the right posterior cerebral artery. Right P1   segment not identified. Posterior cerebral arteries otherwise patent.     NECK MRA:   RIGHT CAROTID: No measurable stenosis or dissection.     LEFT CAROTID: No measurable stenosis or dissection.     VERTEBRAL ARTERIES: Left vertebral artery is dominant. Both cervicovertebral arteries are patent.      AORTIC ARCH: Visualized aortic arch is unremarkable.                                                                      IMPRESSION:  HEAD MRI:   1.  Patchy areas of restricted diffusion are seen within the medial  aspect of the left cerebral hemisphere demonstrating an MCA MAYO watershed type distribution. No finding for hemorrhagic transformation.     2.  Mild paranasal sinus mucosal thickening with right maxillary sinus dependent air-fluid level compatible with active sinus disease.     HEAD MRA:   1.  Exam is degraded by motion artifact.     2.  Short segment severe stenosis M1 segment of the left MCA.     3.  Allowing for artifact, more distal middle and anterior cerebral arteries are patent.     NECK MRA:  1.  No significant stenosis either cervical carotid or vertebral system. No findings for dissection.     Findings called to Dr. Georges at 4:52 PM.

## 2022-02-23 NOTE — TELEPHONE ENCOUNTER
Message sent to patient letting her know we need the request form signed and sent to us and than we can send forms.  Forms place on Dr. Ca desk for her to sign on 2/23/22.    Prachi Hopkins LPN on 2/23/2022 at 3:18 PM

## 2022-03-02 ENCOUNTER — MYC MEDICAL ADVICE (OUTPATIENT)
Dept: NEUROLOGY | Facility: CLINIC | Age: 37
End: 2022-03-02
Payer: COMMERCIAL

## 2022-03-02 NOTE — TELEPHONE ENCOUNTER
Paperwork completed and emailed to patient and suni conway on 3/2/22.  A copy was mailed to the patient and the original sent to scanning as well.    Prachi Hopkins LPN on 3/2/2022 at 10:46 AM

## 2022-03-08 ENCOUNTER — HOSPITAL ENCOUNTER (OUTPATIENT)
Dept: SPEECH THERAPY | Facility: REHABILITATION | Age: 37
End: 2022-03-08
Payer: COMMERCIAL

## 2022-03-08 DIAGNOSIS — R47.01 APHASIA: Primary | ICD-10-CM

## 2022-03-08 DIAGNOSIS — R41.841 COGNITIVE COMMUNICATION DEFICIT: ICD-10-CM

## 2022-03-08 PROCEDURE — 92507 TX SP LANG VOICE COMM INDIV: CPT | Mod: GN | Performed by: SPEECH-LANGUAGE PATHOLOGIST

## 2022-03-08 NOTE — PROGRESS NOTES
"   03/08/22 1000   Signing Clinician's Name / Credentials   Signing clinician's name /credentials Pastora Green M.S. CCC-SLP   Session Number   Session Number 3/11   Progress/Recertification   Progress note due 04/08/22   Recertification Due 05/08/22   Subjective Report   Subjective Report Patient arrived to therapy in good spirits.  She expressed that she feels she overall continues to progress and has been able to tolerating doing more things in a day (e.g., an appointment and 1-2 other tasks vs just an appointment she was previously tolerating).  She expressed that she has been communicating with doctors/nurses on the phone and via [employer] and finds this is getting easier as well for her.  She does still feel frustrated with word finding at times and gave the example of being unable to \"spit it out\" when stuck on a word and talking with her son.   Cognitive/Communication Goal 1   Goal Identifier 1   Goal Description Patient will learn and demonstrate use of x3 word finding strategies in conversation by end of POC.   Target Date 05/08/22   Cognitive/Communication Goal 2   Goal Identifier 2   Goal Description Patient will complete high sentence level expressive language tasks with semantically correct and complete wording in >90% of opportunities.   Target Date 05/08/22   Cognitive/Communication Goal 3   Goal Identifier 3   Goal Description Patient will learn and demonstrate use of x2 cognitive-linguistic compensatory strategies by end of POC.   Target Date 05/08/22   Cognitive/Communication Goal 4   Goal Identifier 4   Goal Description Patient will complete high level memory tasks with >90% accuracy with use of compensatory strategies.   Target Date 05/08/22   Treatment Speech/Lang/Voice   Treatment of Speech, Language, Voice Communication&/or Auditory Processing (60562) 58 Minutes   Skilled Intervention Provided written and verbal information on.;Modeled compensatory strategies;Provided feedback on performance of " tasks   Patient Response Patient with good understanding of compensatory strategies to support cognitive-linguistic challenges as she returns to work.   Treatment Detail Therapy time spend discussing work day to identify strengths, communication challenging, and better implement strategies to support successful return.  Patient expressed that her day is comprised of both daily/routine tasks and unexpected emails/tasks.  Typical structure includes checking emails/preparing for the day from 7-8, x2 meetings with 5-12 people from 8-10 that involves identifying issues, giving instruction/answering questions to [place] accounts.  The remainder of the day she reports is daily routine tasks and tasks that come up; however, they are often familiar tasks.  Cognitive-Linguistic Strategies: Language strategies of practicing explaining/describing tasks.  Patient able to describe job in detail to SLP with modified language for unfamiliar SLP with x3 instances of word finding, which she was able to compensate for with strategy use/rephrasing.  Recommending having visuals of decision making trees/language available to reference/read prior to meetings for language.  Recommended mock descriptions or practicing word finding strategies with job jargon.  Patient currently works from home and has a table, monitors, and chair set up in her basement.  Discussed limiting distractions and having note pad available to write notes/to-do items to support recall and organization.  Patient expressed that she has shifted her space slightly to the left to support right sided visual difficulty.  Discussed that if available from her job, patient would benefit from gradual return to work due to ongoing cognitive-linguistic challenges and demands of the job.  Recommended patient set strict limits for herself to wrap up at the end of the day, as well as to utilize full lunch break to rest/reset.  Patient identified they have a natural break  in the day around 9:30, and again for lunch between 11:30-12.  Patient identified having x2 co-workers on a zoom meeting throughout the day as a strong support should she have difficulty recalling a task/word finding.   Education   Learner Patient   Readiness Acceptance   Method Explanation;Demonstration   Response Verbalizes understanding   Plan   Homework return to work strategies   Plan for next session return to work strategies   Comments   Comments Patient has progressed toward cognitive-linguistic goals and feels prepared to transition back into work.  Therapy session spent discussing and recommending strategies to support successful return, as she continues to experience mild cognitive-linguistic deficits.  In my opinion, patient would benefit from gradual return to work at 4 hours a day for 2 week, increasing to 6-8 hours per day as appropriate after.  Tentative return to work date as Monday 3/14/22.  I will continue to see patient during this transition to further train and implement strategies as any concerns arise.   Total Session Time   Total Treatment Time (sum of timed and untimed services) 58   AMBULATORY CLINICS ONLY-MEDICAL AND TREATMENT DIAGNOSIS   Medical Diagnosis Acute ischemic stroke I63.9   SLP Diagnosis Aphasia, Cognitive Communication Deficit

## 2022-03-15 ENCOUNTER — HOSPITAL ENCOUNTER (OUTPATIENT)
Dept: SPEECH THERAPY | Facility: REHABILITATION | Age: 37
Discharge: HOME | End: 2022-03-15
Payer: COMMERCIAL

## 2022-03-15 DIAGNOSIS — R41.841 COGNITIVE COMMUNICATION DEFICIT: Primary | ICD-10-CM

## 2022-03-15 DIAGNOSIS — R47.01 APHASIA: ICD-10-CM

## 2022-03-15 PROCEDURE — 92507 TX SP LANG VOICE COMM INDIV: CPT | Mod: GN | Performed by: SPEECH-LANGUAGE PATHOLOGIST

## 2022-03-22 ENCOUNTER — TELEPHONE (OUTPATIENT)
Dept: NEUROLOGY | Facility: CLINIC | Age: 37
End: 2022-03-22
Payer: COMMERCIAL

## 2022-03-22 NOTE — TELEPHONE ENCOUNTER
Please explain to Samia that the job description that she sent is nonspecific in terms of the environment, physical needs, etc.  Those are the kinds of things her forms are asking me to comment on further.    Thank you,  Dr. Ca

## 2022-03-24 NOTE — TELEPHONE ENCOUNTER
My chart message sent to patient with provider request of job description on 3/22/22.    Prachi Hopkins LPN on 3/24/2022 at 8:22 AM

## 2022-03-29 ENCOUNTER — HOSPITAL ENCOUNTER (OUTPATIENT)
Dept: SPEECH THERAPY | Facility: REHABILITATION | Age: 37
Discharge: HOME | End: 2022-03-29
Payer: COMMERCIAL

## 2022-03-29 DIAGNOSIS — R47.01 APHASIA: ICD-10-CM

## 2022-03-29 DIAGNOSIS — R41.841 COGNITIVE COMMUNICATION DEFICIT: Primary | ICD-10-CM

## 2022-03-29 PROCEDURE — 92507 TX SP LANG VOICE COMM INDIV: CPT | Mod: GN | Performed by: SPEECH-LANGUAGE PATHOLOGIST

## 2022-04-06 ENCOUNTER — OFFICE VISIT (OUTPATIENT)
Dept: HEMATOLOGY | Facility: CLINIC | Age: 37
End: 2022-04-06
Attending: STUDENT IN AN ORGANIZED HEALTH CARE EDUCATION/TRAINING PROGRAM
Payer: COMMERCIAL

## 2022-04-06 VITALS
RESPIRATION RATE: 16 BRPM | OXYGEN SATURATION: 96 % | SYSTOLIC BLOOD PRESSURE: 133 MMHG | BODY MASS INDEX: 31.07 KG/M2 | WEIGHT: 182 LBS | TEMPERATURE: 97.6 F | DIASTOLIC BLOOD PRESSURE: 79 MMHG | HEART RATE: 78 BPM | HEIGHT: 64 IN

## 2022-04-06 DIAGNOSIS — I63.89: ICD-10-CM

## 2022-04-06 DIAGNOSIS — D68.59 PRIMARY HYPERCOAGULABLE STATE (H): ICD-10-CM

## 2022-04-06 DIAGNOSIS — Z86.73 HISTORY OF STROKE: ICD-10-CM

## 2022-04-06 PROCEDURE — 36415 COLL VENOUS BLD VENIPUNCTURE: CPT | Performed by: INTERNAL MEDICINE

## 2022-04-06 PROCEDURE — 86146 BETA-2 GLYCOPROTEIN ANTIBODY: CPT | Performed by: INTERNAL MEDICINE

## 2022-04-06 PROCEDURE — 99205 OFFICE O/P NEW HI 60 MIN: CPT | Performed by: INTERNAL MEDICINE

## 2022-04-06 PROCEDURE — G0463 HOSPITAL OUTPT CLINIC VISIT: HCPCS

## 2022-04-06 PROCEDURE — 999N000103 HC STATISTIC NO CHARGE FACILITY FEE

## 2022-04-06 ASSESSMENT — PAIN SCALES - GENERAL: PAINLEVEL: NO PAIN (0)

## 2022-04-06 NOTE — PROGRESS NOTES
[facility]  [address] [place] [id number]  Phone: [phone], Fax: [phone]    Outpatient Visit Note:    Patient: Samia Ordoñez  MRN: [id number]  : 1985  GINA: 2022     Reason for Consultation:  Samia Ordoñez is a referred by Dr Lopez for evaluation and treatment of stroke with heterozygosity for the prothrombin gene mutation.    Assessment: Samia Ordoñez is a 36 year old woman with a strong family history of early stroke in her father who herself had her first stroke recently, currently felt to be embolic stroke of undetermined source.  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  My impression is that the heterozygosity for the prothrombin gene mutation does not change our management for ischemic stroke and is not likely the underlying cause for this event.  The best evidence regarding the prothrombin gene mutation is weak evidence for venous thrombosis risk and even less evidence for arterial thrombosis.  Hence, today we discussed her ongoing work-up for stroke and at this point there is no clear evidence that she needs anticoagulation.    Recommendations:  1. I counseled the patient about my assessment of potential causes and currently known risk of recurrence  2. We discussed completing her APS testing today for B2GPI antibodies (LAC and MAYO negative)  3. We also discuss ongoing evaluation to include longer event monitoring for Afib (defer to Neurology)  4. We discussed the fact that F2 heterozygosity is weakly associated with arterial thrombosis risk and so would not   5. I encouraged her to be aggressive in management cardiovasclar risk factors for secondary prevention    65 minutes spent on the date of the encounter doing chart review, review of test results, interpretation of tests, patient visit and documentation      Emiliano Blackwood MD   of Medicine, Division of Hematology, Oncology and Transplantation  [employer] [facility] [employer]     -------------------------------  History: Samia Ordoñez is a 36 year old essentially healthy woman who presented with right-sided neglect and numbness and tingling in her right fingertips as well as a general feeling that she was unwell and was eventually found to have embolic strokes, which currently have not been explained.  She did have some evidence of arterial stenosis as well as and watershed infarcts.  She was started on a regimen of aspirin plus Brilinta and then full dose aspirin thereafter.  She is also on atorvastatin and encouraged to maintain good blood pressure control.  She was also seen by cardiology for small PFO and closure was not recommended.    Overall, today she is feeling well.  She is back to work full-time.  She does note that she has a strong family history of early stroke in her father who unfortunately is recently  secondary to multiple recurrent strokes.  She is tolerating aspirin well without any bleeding concerns.  She is due to see neurology in the coming next few days.  She is not aware that she has completed a 30-day event monitor which was suggested by neurology in December.      Physical Exam:  Vitals: B/P: 133/79, T: 97.6, P: 78, R: 16, Wt: 182 lbs 0 oz Body mass index is 31.24 kg/m .   Exam:   Gen: Appears well, no distress  HEENT: no scleral icterus or hemorrhage, no wet purpura, no lymphadenopathy  CV: regular, no murmurs  Pulm: clear  Abd: soft, nontender, no splenomegaly  Ext: no edema  Skin: no ecchymoses or hematomas  Neuro: no focal deficits, affect and cognition are normal    Labs:  Thrombophilia testing is negative except for F2 heterozygosity.    Imaging:  Reviewed MRI

## 2022-04-06 NOTE — PATIENT INSTRUCTIONS
[facility] was to discuss causes and treatment for stroke.  The 3 main causes of stroke are: 1) atherosclerosis 2) Atrial Fibrillation 3) unknown and rare causes    So far:  1. We have seen a small amount of atherosclerosis so treating those risk factors (blood pressure and cholesterol) is important.   2.  We haven't seen any atrial fibrillation but a heart monitor for a longer period of time (weeks) may be necessary to be completely certain you don't have this  3.  We did one final test today for a rare cause of stroke called APS or antiphospholipid antibody syndrome.  This should be back by the time you see your Neurologist.  Your Factor 2 variant is very common and is a weak risk factor for stroke and would not change our management.    For now I agree with your use of Aspirin as the primary treatment to prevent future strokes

## 2022-04-09 LAB
B2 GLYCOPROT1 IGG SERPL IA-ACNC: 1.1 U/ML
B2 GLYCOPROT1 IGM SERPL IA-ACNC: <2.4 U/ML

## 2022-04-12 ENCOUNTER — OFFICE VISIT (OUTPATIENT)
Dept: NEUROLOGY | Facility: CLINIC | Age: 37
End: 2022-04-12
Payer: COMMERCIAL

## 2022-04-12 VITALS
BODY MASS INDEX: 31.34 KG/M2 | WEIGHT: 183.6 LBS | HEART RATE: 59 BPM | SYSTOLIC BLOOD PRESSURE: 125 MMHG | HEIGHT: 64 IN | DIASTOLIC BLOOD PRESSURE: 88 MMHG

## 2022-04-12 DIAGNOSIS — E78.5 HYPERLIPIDEMIA LDL GOAL <70: ICD-10-CM

## 2022-04-12 DIAGNOSIS — Z86.73 HISTORY OF STROKE: Primary | ICD-10-CM

## 2022-04-12 DIAGNOSIS — I63.9 ACUTE ISCHEMIC STROKE (H): ICD-10-CM

## 2022-04-12 DIAGNOSIS — Q21.12 PFO (PATENT FORAMEN OVALE): ICD-10-CM

## 2022-04-12 DIAGNOSIS — Z79.899 ENCOUNTER FOR LONG-TERM (CURRENT) USE OF MEDICATIONS: ICD-10-CM

## 2022-04-12 PROCEDURE — 99214 OFFICE O/P EST MOD 30 MIN: CPT | Performed by: PSYCHIATRY & NEUROLOGY

## 2022-04-12 RX ORDER — ATORVASTATIN CALCIUM 40 MG/1
40 TABLET, FILM COATED ORAL EVERY EVENING
Qty: 90 TABLET | Refills: 2 | Status: SHIPPED | OUTPATIENT
Start: 2022-04-12 | End: 2022-07-11

## 2022-04-12 NOTE — PATIENT INSTRUCTIONS
Plan:  -- Additional cardiac monitoring for 3 weeks.  We will notify you of the results.  -- Continue the atorvastatin: 40mg daily.  -- Okay to transition to 81 mg aspirin daily.  -- Recheck cholesterol levels in a couple of months.  Order is in.  Return to neurology clinic in 6 months.  Please let us know if any concerns arise in the meantime.

## 2022-04-12 NOTE — PROGRESS NOTES
ESTABLISHED PATIENT NEUROLOGY NOTE    DATE OF VISIT: 4/12/2022  MRN: [id number]  PATIENT NAME: Samia Ordoñez  YOB: 1985    Chief Complaint   Patient presents with     Stroke     Follow up- discuss medication and discuss accommodation for work      SUBJECTIVE:                                                      HISTORY OF PRESENT ILLNESS:  Samia is here for follow up regarding stroke.    History as previously documented by me (1.10.22):   Per chart review Samia was hospitalized at [facility] on 12.23-12.24.21 for left-sided MCA and MAYO watershed infarcts, felt to be embolic in etiology.  She has a history of migraines and presented to [facility] after she developed numbness and tingling in the right hand.  She was also noticing some clumsiness and neglect on that side.  Imaging revealed left M1 stenosis and the strokes.  She was transferred to the [facility] for further work-up.  She was also experiencing throbbing frontal headaches at the time.  Hemoglobin A1c was 5.3.  LDL was 151, and blood pressures 140s systolic.  TTE was unremarkable.  aPTT normal, negative beta-2 microglobulin and lupus panel.  RIRI was completed as outpatient, results pending.  It was also recommended that she see hematology for hypercoagulopathy work-up as outpatient.  This is scheduled for April.  She was discharged on aspirin and Brilinta, as well as Lipitor.  Plan is for transition to full-strength aspirin on 1.15.22.     Her father had strokes. She had one miscarriage.  She had a healthy child thereafter, though he was born a little early at 27 weeks.     She feels mostly back to normal.  She still feels cognitively foggy and the harder she tries to think the more foggy she becomes.  She has been trying to do puzzles on her computer, games to keep her mind sharp.  She does not feel that she could work at this point.  She also asks about driving.  Her numbness in the hand has completely resolved.  She does notice  that her peripheral vision in the right eye is improving.     She did have a RIRI completed, and says that as far she knows the results are normal.  It is not clear why these are not available to review.  She is not supposed to follow-up with cardiology.     RIRI showed PFO.  As planned, Samia was seen by hematology and labs revealed elevated protein S and heterozygous mutation of factor II, the latter of which notably not felt to be a significant finding per hematology note.    She needs documentation about her medical condition.  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  She would like to work in the office just 1 day a week to start and see how this does for several weeks before taking on additional days.  She says she did see the eye doctor as well and was told that the visual changes she has in the right eye are not likely going to improve but should not get worse.  She stopped the Brilinta and continues on the aspirin 325mg.  She is also on the atorvastatin.  She understands that her labs for coagulopathy are unremarkable, I do not explain her stroke.  No new neurologic symptoms, except that occasionally (about once a month) she does have some numbness on the right side of her tongue which resolves on its own, usually lasting about half an hour..    CURRENT MEDICATIONS:   albuterol (PROAIR HFA/PROVENTIL HFA/VENTOLIN HFA) 108 (90 Base) MCG/ACT inhaler, Inhale 2 puffs into the lungs every 6 hours as needed for shortness of breath / dyspnea or wheezing  naproxen (NAPROSYN) 500 MG tablet, Take 500 mg by mouth 2 times daily as needed for moderate pain  ticagrelor (BRILINTA) 90 MG tablet, Take 1 tablet (90 mg) by mouth 2 times daily for 20 days (Patient not taking: Reported on 4/6/2022)    No current facility-administered medications on file prior to visit.      RECENT DIAGNOSTIC STUDIES:   Labs: No  "results found for any visits on 04/12/22.    RIRI (12.29.21):  Compared to Previous Study  This study was compared with the study from 12/23/2020 . PFO was noted on  today's study.    REVIEW OF SYSTEMS:                                                      10-point review of systems is negative except as mentioned above in HPI.    EXAM:                                                      Physical Exam:   Vitals: /88 (BP Location: Right arm, Patient Position: Sitting)   Pulse 59   Ht 1.626 m (5' 4\")   Wt 83.3 kg (183 lb 9.6 oz)   BMI 31.51 kg/m    BMI= Body mass index is 31.51 kg/m .  GENERAL: NAD.  HEENT: NC/AT.  CV: RRR. S1, S2.   NECK: No bruits.  PULM: Non-labored breathing.   Neurologic:  MENTAL STATUS: Alert, attentive. Speech is fluent. Normal comprehension. Normal concentration. Adequate fund of knowledge.   CRANIAL NERVES: Discs flat. Visual fields intact to confrontation. Pupils equally, round and reactive to light. Facial sensation and movement normal. EOM full. Hearing intact to conversation.Trapezius strength intact. Palate moves symmetrically. Tongue midline.  MOTOR: 5/5 in proximal and distal muscle groups of upper and lower extremities. Tone and bulk normal.   DTRs: Intact and symmetric in biceps, BR, patellae.  Babinski down-going bilaterally.   SENSATION: Normal light touch throughout.  COORDINATION: Normal finger nose finger. Finger tapping normal. Knee heel shin normal.  STATION AND GAIT: Romberg Negative. Good postural reflexes. Casual gait is normal.    ASSESSMENT and PLAN:                                                      Assessment:    ICD-10-CM    1. History of stroke  Z86.73 aspirin (ASA) 81 MG EC tablet     Cardiac Event Monitor Adult Pediatric     Lipid panel reflex to direct LDL Fasting   2. Acute ischemic stroke (H)  I63.9 atorvastatin (LIPITOR) 40 MG tablet   3. Encounter for long-term (current) use of medications  Z79.899 aspirin (ASA) 81 MG EC tablet   4. PFO (patent " foramen ovale)  Q21.1 Cardiac Event Monitor Adult Pediatric   5. Hyperlipidemia LDL goal <70  E78.5         Ms. Ordoñez is a pleasant 36-year-old woman here for follow-up regarding stroke.  The cause of her stroke is still under investigation.  Hypercoagulopathy work-up was unremarkable.  RIRI shows a PFO, so I think it would be reasonable to do some additional cardiac monitoring for a few weeks.  We had a long discussion about some of the fogginess that Samia is experiencing which is normal after stroke.  I do anticipate she will feel better over time but I think it is reasonable to request some work restrictions as she recovers.  I would like to see Samia back in clinic in about 6 months.  She understands and agrees with the plan.    Plan:  -- Additional cardiac monitoring for 3 weeks.  We will notify you of the results.  -- Continue the atorvastatin: 40mg daily.  -- Okay to transition to 81 mg aspirin daily.  -- Recheck cholesterol levels in a couple of months.  Order is in.  -- Return to neurology clinic in 6 months.  Please let us know if any concerns arise in the meantime.    Total Time: 30 minutes were spent with the patient and in chart review/documentation (as described above in Assessment and Plan)/coordinating the care on date of service.    Sarah Ca MD  Neurology    Dragon software used in the dictation of this note.

## 2022-04-12 NOTE — NURSING NOTE
Chief Complaint   Patient presents with     Stroke     Follow up- discuss medication and discuss accommodation for work      Gilson Strauss CMA@ on 4/12/2022 at 9:23 AM

## 2022-04-12 NOTE — LETTER
4/12/2022         RE: Samia Ordoñez  [address]  [place] [address]        Dear Colleague,    Thank you for referring your patient, Samia Ordoñez, to the [facility] [place]. Please see a copy of my visit note below.    ESTABLISHED PATIENT NEUROLOGY NOTE    DATE OF VISIT: 4/12/2022  MRN: [id number]  PATIENT NAME: Samia Ordoñez  YOB: 1985    Chief Complaint   Patient presents with     Stroke     Follow up- discuss medication and discuss accommodation for work      SUBJECTIVE:                                                      HISTORY OF PRESENT ILLNESS:  Samia is here for follow up regarding stroke.    History as previously documented by me (1.10.22):   Per chart review Samia was hospitalized at [facility] on 12.23-12.24.21 for left-sided MCA and MAYO watershed infarcts, felt to be embolic in etiology.  She has a history of migraines and presented to [facility] after she developed numbness and tingling in the right hand.  She was also noticing some clumsiness and neglect on that side.  Imaging revealed left M1 stenosis and the strokes.  She was transferred to the [facility] for further work-up.  She was also experiencing throbbing frontal headaches at the time.  Hemoglobin A1c was 5.3.  LDL was 151, and blood pressures 140s systolic.  TTE was unremarkable.  aPTT normal, negative beta-2 microglobulin and lupus panel.  RIRI was completed as outpatient, results pending.  It was also recommended that she see hematology for hypercoagulopathy work-up as outpatient.  This is scheduled for April.  She was discharged on aspirin and Brilinta, as well as Lipitor.  Plan is for transition to full-strength aspirin on 1.15.22.     Her father had strokes. She had one miscarriage.  She had a healthy child thereafter, though he was born a little early at 27 weeks.     She feels mostly back to normal.  She still feels cognitively foggy and the harder she tries to  think the more foggy she becomes.  She has been trying to do puzzles on her computer, games to keep her mind sharp.  She does not feel that she could work at this point.  She also asks about driving.  Her numbness in the hand has completely resolved.  She does notice that her peripheral vision in the right eye is improving.     She did have a RIRI completed, and says that as far she knows the results are normal.  It is not clear why these are not available to review.  She is not supposed to follow-up with cardiology.     RIRI showed PFO.  As planned, Samia was seen by hematology and labs revealed elevated protein S and heterozygous mutation of factor II, the latter of which notably not felt to be a significant finding per hematology note.    She needs documentation about her medical condition.  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  She would like to work in the office just 1 day a week to start and see how this does for several weeks before taking on additional days.  She says she did see the eye doctor as well and was told that the visual changes she has in the right eye are not likely going to improve but should not get worse.  She stopped the Brilinta and continues on the aspirin 325mg.  She is also on the atorvastatin.  She understands that her labs for coagulopathy are unremarkable, I do not explain her stroke.  No new neurologic symptoms, except that occasionally (about once a month) she does have some numbness on the right side of her tongue which resolves on its own, usually lasting about half an hour..    CURRENT MEDICATIONS:   albuterol (PROAIR HFA/PROVENTIL HFA/VENTOLIN HFA) 108 (90 Base) MCG/ACT inhaler, Inhale 2 puffs into the lungs every 6 hours as needed for shortness of breath / dyspnea or wheezing  naproxen (NAPROSYN) 500 MG tablet, Take 500 mg by mouth 2 times daily as needed for  "moderate pain  ticagrelor (BRILINTA) 90 MG tablet, Take 1 tablet (90 mg) by mouth 2 times daily for 20 days (Patient not taking: Reported on 4/6/2022)    No current facility-administered medications on file prior to visit.      RECENT DIAGNOSTIC STUDIES:   Labs: No results found for any visits on 04/12/22.    RIRI (12.29.21):  Compared to Previous Study  This study was compared with the study from 12/23/2020 . PFO was noted on  today's study.    REVIEW OF SYSTEMS:                                                      10-point review of systems is negative except as mentioned above in HPI.    EXAM:                                                      Physical Exam:   Vitals: /88 (BP Location: Right arm, Patient Position: Sitting)   Pulse 59   Ht 1.626 m (5' 4\")   Wt 83.3 kg (183 lb 9.6 oz)   BMI 31.51 kg/m    BMI= Body mass index is 31.51 kg/m .  GENERAL: NAD.  HEENT: NC/AT.  CV: RRR. S1, S2.   NECK: No bruits.  PULM: Non-labored breathing.   Neurologic:  MENTAL STATUS: Alert, attentive. Speech is fluent. Normal comprehension. Normal concentration. Adequate fund of knowledge.   CRANIAL NERVES: Discs flat. Visual fields intact to confrontation. Pupils equally, round and reactive to light. Facial sensation and movement normal. EOM full. Hearing intact to conversation.Trapezius strength intact. Palate moves symmetrically. Tongue midline.  MOTOR: 5/5 in proximal and distal muscle groups of upper and lower extremities. Tone and bulk normal.   DTRs: Intact and symmetric in biceps, BR, patellae.  Babinski down-going bilaterally.   SENSATION: Normal light touch throughout.  COORDINATION: Normal finger nose finger. Finger tapping normal. Knee heel shin normal.  STATION AND GAIT: Romberg Negative. Good postural reflexes. Casual gait is normal.    ASSESSMENT and PLAN:                                                      Assessment:    ICD-10-CM    1. History of stroke  Z86.73 aspirin (ASA) 81 MG EC tablet     Cardiac " Event Monitor Adult Pediatric     Lipid panel reflex to direct LDL Fasting   2. Acute ischemic stroke (H)  I63.9 atorvastatin (LIPITOR) 40 MG tablet   3. Encounter for long-term (current) use of medications  Z79.899 aspirin (ASA) 81 MG EC tablet   4. PFO (patent foramen ovale)  Q21.1 Cardiac Event Monitor Adult Pediatric   5. Hyperlipidemia LDL goal <70  E78.5         Ms. Ordoñez is a pleasant 36-year-old woman here for follow-up regarding stroke.  The cause of her stroke is still under investigation.  Hypercoagulopathy work-up was unremarkable.  RIRI shows a PFO, so I think it would be reasonable to do some additional cardiac monitoring for a few weeks.  We had a long discussion about some of the fogginess that Samia is experiencing which is normal after stroke.  I do anticipate she will feel better over time but I think it is reasonable to request some work restrictions as she recovers.  I would like to see Samia back in clinic in about 6 months.  She understands and agrees with the plan.    Plan:  -- Additional cardiac monitoring for 3 weeks.  We will notify you of the results.  -- Continue the atorvastatin: 40mg daily.  -- Okay to transition to 81 mg aspirin daily.  -- Recheck cholesterol levels in a couple of months.  Order is in.  -- Return to neurology clinic in 6 months.  Please let us know if any concerns arise in the meantime.    Total Time: 30 minutes were spent with the patient and in chart review/documentation (as described above in Assessment and Plan)/coordinating the care on date of service.    Sarah Ca MD  Neurology    Dragon software used in the dictation of this note.                        Again, thank you for allowing me to participate in the care of your patient.        Sincerely,        Sarah Ca MD

## 2022-04-20 ENCOUNTER — TELEPHONE (OUTPATIENT)
Dept: NEUROLOGY | Facility: CLINIC | Age: 37
End: 2022-04-20
Payer: COMMERCIAL

## 2022-04-20 NOTE — TELEPHONE ENCOUNTER
Patient Accomodation for work forms completed and emailed to patient at [email or web address] on April 12, 2022. A copy will be sent to medical records to scan into patient's chart.     Gilson Strauss CMA@ on 4/20/2022 at 10:34 AM

## 2022-04-25 ENCOUNTER — HOSPITAL ENCOUNTER (OUTPATIENT)
Dept: CARDIOLOGY | Facility: CLINIC | Age: 37
Discharge: HOME | End: 2022-04-25
Attending: PSYCHIATRY & NEUROLOGY | Admitting: PSYCHIATRY & NEUROLOGY
Payer: COMMERCIAL

## 2022-04-25 DIAGNOSIS — Q21.12 PFO (PATENT FORAMEN OVALE): ICD-10-CM

## 2022-04-25 DIAGNOSIS — Z86.73 HISTORY OF STROKE: ICD-10-CM

## 2022-04-25 PROCEDURE — 93270 REMOTE 30 DAY ECG REV/REPORT: CPT

## 2022-04-26 NOTE — PROGRESS NOTES
"[facility]    Outpatient Speech Language Pathology Discharge Note  Patient: Samia Ordoñez  : 1985    Beginning/End Dates of Reporting Period:  22 to 3/29/22    Referring Provider: Stefano Faith Diagnosis: Aphasia, Cognitive Communication Deficit    Client Self Report: Patient arrived to therapy in good spirits.  Patient fatigued due to not sleeping well as her cat is ill.  Patient expressed that return to work is going \"pretty good\" but that it continues to be challenging toward the end of the day.  She expressed that \"word finding has been easier\" and that her  has commented that she is \"finding words faster and not losing them as often\".        Goals:  Goal Identifier 1   Goal Description Patient will learn and demonstrate use of x3 word finding strategies in conversation by end of POC.   Target Date 22   Date Met  22   Progress (detail required for progress note): 3/29/21: Goal met.     Goal Identifier 2   Goal Description Patient will complete high sentence level expressive language tasks with semantically correct and complete wording in >90% of opportunities.   Target Date 22   Date Met  22   Progress (detail required for progress note): 3/29/22: Goal met in conversation.     Goal Identifier 3   Goal Description Patient will learn and demonstrate use of x2 cognitive-linguistic compensatory strategies by end of POC.   Target Date 22   Date Met  22   Progress (detail required for progress note): 3/29/22: Goal met.     Goal Identifier 4   Goal Description Patient will complete high level memory tasks with >90% accuracy with use of compensatory strategies.   Target Date 22   Date Met      Progress (detail required for progress note): 3/29/22: Goal not addressed due to prioritization of other goals/functional retun to work. "       Plan:  Discharge from therapy.    Discharge:    Reason for Discharge: Patient has met all goals.  Patient chooses to discontinue therapy.    Equipment Issued: NA    Discharge Plan: Patient to continue home program.

## 2022-04-26 NOTE — ADDENDUM NOTE
Encounter addended by: Pastora Green, SLP on: 4/26/2022 7:59 AM   Actions taken: Clinical Note Signed

## 2022-05-17 PROCEDURE — 93272 ECG/REVIEW INTERPRET ONLY: CPT | Performed by: INTERNAL MEDICINE

## 2022-05-17 NOTE — RESULT ENCOUNTER NOTE
Please let Samia know that her cardiac event monitor was unremarkable, no evidence of arrhythmia.    Thank you,  Dr. Ca

## 2022-06-27 NOTE — ADDENDUM NOTE
Encounter addended by: Nelly Molina, OT on: 6/27/2022 5:01 PM   Actions taken: Episode resolved, Clinical Note Signed

## 2022-06-27 NOTE — PROGRESS NOTES
[facility]    Outpatient Occupational Therapy Discharge Note  Patient: Samia Ordoñez  : 1985    Beginning/End Dates of Reporting Period:  22 to 22    Referring Provider: Dr. Sarah Ca    Therapy Diagnosis: cognitive changes, decreased ADL and IADL function, right UE incoordination, Vision changes    Client Self Report: Patient reports that about 2 weeks ago, she had a long day and was exhausted. She had an overall feeling of fogginess and noticed that her speech changed the next day. Word finding is her primary new symptom. The foggy feeling and fatigue has resolved.      Goals:     Goal Identifier     Goal Description Patient will be able to return to work at least part time by the end of February.    Target Date 22   Date Met      Progress (detail required for progress note): Patient is close to returning to work but does not seem ready yet. See daily note for details     Goal Identifier     Goal Description Patient will be able to manage household finances independently using compensatory strategies for organization    Target Date 22   Date Met      Progress (detail required for progress note): Patient does this with husbands supervision     Goal Identifier     Goal Description Patient will be able to prepare full meal independently using strategies to organize task(grocery list, set up and timing)   Target Date 22   Date Met  22   Progress (detail required for progress note):       Goal Identifier     Goal Description Patient will be able to order, set up and take her medications independently using reminders   Target Date 22   Date Met  22   Progress (detail required for progress note):       Discharge from therapy.  Discharge from therapy.

## 2022-07-11 ENCOUNTER — TELEPHONE (OUTPATIENT)
Dept: NEUROLOGY | Facility: CLINIC | Age: 37
End: 2022-07-11

## 2022-07-11 DIAGNOSIS — I63.9 ACUTE ISCHEMIC STROKE (H): ICD-10-CM

## 2022-07-11 RX ORDER — ATORVASTATIN CALCIUM 40 MG/1
80 TABLET, FILM COATED ORAL EVERY EVENING
Qty: 180 TABLET | Refills: 2 | Status: SHIPPED | OUTPATIENT
Start: 2022-07-11 | End: 2022-10-13 | Stop reason: SINTOL

## 2022-07-11 NOTE — TELEPHONE ENCOUNTER
"Please let Samia know that her cholesterol levels look okay for the most part.  Her \"bad\" cholesterol (LDL) is still a little too high though given her history of stroke.  I recommend she increase the Lipitor from 40mg to 80mg to try to get closer to goal (LDL <70).    New prescription sent to [facility] in [place].    Thank you,  Dr. Ca  "

## 2022-07-12 NOTE — TELEPHONE ENCOUNTER
Patient given results and recommendation as per Dr. Ca.    Patient stated that the Lipitor was given patient very bad muscle ache and she went in to see her primary care doctor and she switched Lipitor to Crestor. Right now patient is taking 10 mg Crestor at bed time.     Gilson Strauss CMA@ on 7/12/2022 at 9:16 AM

## 2022-07-13 NOTE — TELEPHONE ENCOUNTER
I recommend she increase the Crestor to 20mg daily. She can check in with her primary physician regarding this too, to make sure they agree.      CHJ

## 2022-07-14 NOTE — TELEPHONE ENCOUNTER
Patient informed of Dr. Ca recommendation. Per patient the reason why she changed medication was because of her pain so she will reach out to her primary care doctor and will go from there.     Gilson Strauss CMA@ on 7/14/2022 at 9:44 AM

## 2022-09-25 ENCOUNTER — HEALTH MAINTENANCE LETTER (OUTPATIENT)
Age: 37
End: 2022-09-25

## 2022-10-13 ENCOUNTER — OFFICE VISIT (OUTPATIENT)
Dept: NEUROLOGY | Facility: CLINIC | Age: 37
End: 2022-10-13
Payer: COMMERCIAL

## 2022-10-13 VITALS
DIASTOLIC BLOOD PRESSURE: 81 MMHG | SYSTOLIC BLOOD PRESSURE: 135 MMHG | BODY MASS INDEX: 33.51 KG/M2 | WEIGHT: 195.2 LBS | HEART RATE: 45 BPM

## 2022-10-13 DIAGNOSIS — I63.89: Primary | ICD-10-CM

## 2022-10-13 PROCEDURE — 99215 OFFICE O/P EST HI 40 MIN: CPT

## 2022-10-13 RX ORDER — LOSARTAN POTASSIUM 25 MG/1
TABLET ORAL
COMMUNITY
Start: 2022-08-20

## 2022-10-13 RX ORDER — ROSUVASTATIN CALCIUM 20 MG/1
TABLET, COATED ORAL
COMMUNITY
Start: 2022-07-20 | End: 2023-04-13 | Stop reason: SINTOL

## 2022-10-13 NOTE — PROGRESS NOTES
__________________________________________________________    ___________________________________  ESTABLISHED PATIENT NEUROLOGY NOTE    DATE OF VISIT: 10/13/2022  MRN: [id number]  PATIENT NAME: Samia Ordoñez  YOB: 1985      Chief Complaint: Patient presents with:  Stroke: 6 mo follow-up       SUBJECTIVE:                                                      HISTORY OF PRESENT ILLNESS:  Samia is here for follow up regarding stroke    Samia Ordoñez is a 37 year old female  with a history stroke. She follows with Dr. Ca in the clinic.  Per chart review Samia was hospitalized at [facility] in December 2021,  for left-sided MCA and MAYO watershed infarcts, felt to be embolic in etiology.  She has a history of migraines and presented to [facility] after she developed numbness and tingling in the right hand.  She was also noticing some clumsiness and neglect on that side. Imaging revealed left M1 stenosis and the strokes.  She was transferred to the [place] for further work-up.  She was also experiencing throbbing frontal headaches at the time.  Hemoglobin A1c was 5.3.  LDL was 151, and blood pressures 140s systolic.  TTE was unremarkable.  aPTT normal, negative beta-2 microglobulin and lupus panel.  RIRI was completed as outpatient, and showed a PFO.  It was also recommended that she see hematology for hypercoagulopathy work-up as outpatient, this revealed elevated protein S and heterozygous mutation of factor II, the latter of which notably not felt to be a significant finding per hematology note. She was discharged on aspirin and Brilinta, as well as Lipitor.  Plan is for transition to full-strength aspirin on 1.15.22.    She stopped the Brilinta and continues on the aspirin 325mg.  She is also on the atorvastatin.      Today 10/13/22  Samia arrives to the clinic today for her 6 month follow.  She describes her stroke as: Having a trobbing headache and increased confusion.  She went to  bed thinking she was just tired and woke up the next morning knowing something was wrong.  She went into the urgent care and they transferred her to [facility] for a stroke work-up.  She states that her right hand and arm slowly went numb and she lost motor coordination. This has fully resolved.     She is concerned over continued memory loss and choosing the wrong or opposite word when talking. She notes that this is worse when she is tired or under increased stress. This ebbs and flows, some weeks are much better than others.  Her  is constantly worried about her. They would like an MRI to make sure she has not had another stroke, despite no new symptoms. She feels this may help her  relax from his increased worry over her. 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    She states she is taking aspirin 81 mg daily, rosuvastatin 20 mg daily and losartan 25 mg daily.  She is tolerating the medications well with the exception of rosuvastatin, which causes mild muscle ache.  She reports that her primary care provider took her off of this medication for a few weeks to see if that would help.  She continued to have right hip pain. She was restarted on her statin and physical therapy was ordered. She has this once a week.    Samia also arrives today with asymptomatic bradycardia. She denies any chest pain, SOB or palpitations.  She does not feel that this is related to any new medication since she has a history of having a slowed HR. She states that she remembers having it as far back as when she was pregnant with her 15 year old son.   After reviewing her cardiac monitor, her heart rate dips into the 40s.  She states that she has a appointment with her primary care provider next week and will address this.     Current Prevention Medications:  Reported by patient  aspirin (ASA) 81 MG EC tablet-  yes  rosuvastatin - 20 mg  Tablet daily - yes  and losartan 25 mg I tablet daily- yes    Perceived Side Effects: Yes, low grade muscle pain on statins    Medication Notes:   AED Medication Compliance:  compliant   Using a pill box:  Yes     Psycho-Social History: Samia Ordoñez currently lives [place]. Highest level of education 4 classes to finish BA in Accounting . Employment status: [employer].     Patient does not smoke, rare alcohol use, no recreational drug use.  Currently, patient denies feeling depressed, denies feeling anhedonia, denies suicidal  thoughts, and denies having feelings of excessive guilt/worthlessness.  We reviewed importance of mental and emotional wellbeing and impact on health.      Current Medications:   albuterol (PROAIR HFA/PROVENTIL HFA/VENTOLIN HFA) 108 (90 Base) MCG/ACT inhaler, Inhale 2 puffs into the lungs every 6 hours as needed for shortness of breath / dyspnea or wheezing  aspirin (ASA) 81 MG EC tablet, Take 1 tablet (81 mg) by mouth in the morning.  losartan (COZAAR) 25 MG tablet,   naproxen (NAPROSYN) 500 MG tablet, Take 500 mg by mouth 2 times daily as needed for moderate pain  rosuvastatin (CRESTOR) 20 MG tablet,   atorvastatin (LIPITOR) 40 MG tablet, Take 2 tablets (80 mg) by mouth every evening  ticagrelor (BRILINTA) 90 MG tablet, Take 1 tablet (90 mg) by mouth 2 times daily for 20 days (Patient not taking: Reported on 4/6/2022)    No current facility-administered medications on file prior to visit.    Past Medical History:   Patient  has no past medical history on file.  Surgical History:  She  has no past surgical history on file.  Family and Social History:  Reviewed, and she  reports that she has quit smoking. She has never used smokeless tobacco. She reports current alcohol use.  Reviewed, and family history includes Cerebrovascular Disease in her father.    RECENT DIAGNOSTIC STUDIES:   Labs:    Coagulation studies:  Recent Labs   Lab Test 12/24/21  1641  12/22/21  1127   INR 1.00 0.95        Lipid panel:  Recent Labs   Lab Test 12/22/21  1127   CHOL 219*   HDL 46*   *   TRIG 112       HbA1C:  Recent Labs   Lab Test 12/22/21  1127   A1C 5.3       Troponin:  No lab results found.  No lab results found.  No lab results found.    Imaging:   CT HEAD W/O CONTRAST, CT HEAD PERFUSION WITH CONTRAST, CTA  HEAD NECK  WITH CONTRAST 12/24/2021 11:13 AM  Impression:   1. Evolving subacute infarctions in the left MCA distribution. No  acute cerebral infarct.  2. Suspected acute to subacute left pontine infarct.  3. Mild stenosis of the left M1 MCA segment, previously severe.   4. No stenosis of the cervical arteries.  5. No acute intracranial hemorrhage.  EXAM: MR BRAIN W/O AND W CONTRAST, MRA NECK (CAROTIDS) WO AND W CONTRAST, MRA BRAIN ([place] [facility]) WO CONTRAST  DATE/TIME: 12/22/2021 1:24 PM  IMPRESSION:  HEAD MRI:   1.  Patchy areas of restricted diffusion are seen within the medial aspect of the left cerebral hemisphere demonstrating an MCA MAYO watershed type distribution. No finding for hemorrhagic transformation.  2.  Mild paranasal sinus mucosal thickening with right maxillary sinus dependent air-fluid level compatible with active sinus disease.  HEAD MRA:   1.  Exam is degraded by motion artifact.  2.  Short segment severe stenosis M1 segment of the left MCA.  3.  Allowing for artifact, more distal middle and anterior cerebral arteries are patent.  NECK MRA:  1.  No significant stenosis either cervical carotid or vertebral system. No findings for dissection.     REVIEW OF SYSTEMS:                                                      10-point review of systems is negative except as mentioned above in HPI.    EXAM:                                                      Physical Exam:   Vitals: /81   Pulse (!) 45   Wt 88.5 kg (195 lb 3.2 oz)   BMI 33.51 kg/m    BMI= Body mass index is 33.51 kg/m .  GENERAL: NAD.  HEENT: NC/AT.  CV: bradycardic  PULM:  Non-labored breathing. LS clear  Neurologic:  MENTAL STATUS: Alert, attentive. Speech is fluent. Normal comprehension. Normal concentration. Adequate fund of knowledge.   CRANIAL NERVES: Visual fields intact to confrontation. Pupils equally, round and reactive to light. Facial sensation and movement normal. EOM full. Hearing intact to conversation. Trapezius strength intact. Palate moves symmetrically. Tongue midline.  MOTOR: 5/5 in proximal and distal muscle groups of upper and lower extremities. Tone and bulk normal.   DTRs: Intact and symmetric in biceps, BR, triceps and patellae.   SENSATION: Normal light touch throughout.   COORDINATION: Normal finger nose finger. Finger tapping normal. Knee heel shin normal.  STATION AND GAIT: Romberg Negative. Good postural reflexes. Casual gait and tandem Normal  right hand-dominant.      ASSESSMENT and PLAN:                                                      Assessment:    ICD-10-CM    1. Left MCA-MAYO watershed infarction  I63.89 MR Brain w/o & w Contrast        Samia Ordoñez is a 37 year old female with a history stroke. She follows with Dr. Ca in the clinic.  Per chart review, Samia was hospitalized at [facility] in December 2021,  for left-sided MCA and MAYO watershed infarcts, felt to be embolic in etiology.  She is concerned over continued memory loss worse with mental fatigue or increased stress.  We discussed reasonable expectations after a stroke and expected recovery time. She would like an MRI to r/o additional stroke to help relieve anxiety of her and her . I ordered an MRI. She has been taking medications as prescribed by her PCP to manage her vascular risk factors. She will discuss her asymptomatic bradycardia with her PCP and medication next week.     We discussed interventions, will plan to see the patient back in 6 months. Samia understands and agrees with this plan.     Plan:   --- MRI ordered  --- Keep appointment with PCP next week. Discuss  muscle aches with statins and Bradycardia. HR 45 bpm.   --- Goal is to prevent future strokes.  Antiplatelet therapy:  - Continue preventive therapy: Asprin 81 mg , daily    Personalized modifiable risk factors that contribute to strokes   Elevated blood Pressure  BP: 135/81  - Goal <130/80  - Continue preventive therapy: Losartan 25 mg daily     Elevated cholesterol levels   LDL: from 06/24/22: 100  - Goal < 70 mg/dl  - Continue preventive therapy: Rosuvastatin 20 mg daily    Lifestyle  - Goal: Eat a well balanced meal and increased physical activity  Personalized goal: Try to eat fresh veggies 3 times a day. Continue working with physical therapy.    --- Plan on follow up in the Neurology Clinic in 6 months.  --- Please feel free to reach out if you have any further questions or concerns.  --- Seek immediate medical attention if an emergency arises or if your health becomes progressively worse.     For any acute neurologic deficits she was advised to  go directly to the hospital rather than call the clinic.    It was a pleasure to meet you today!     Total Time: Total time spent for face to face visit, reviewing labs/imaging studies, counseling and coordination of care was: 1 Hour spent on the date of the encounter doing chart review, review of test results, patient visit, documentation and discussion with other provider(s)       This note was dictated using voice recognition software.  Any grammatical or context distortions are unintentional and inherent to the software.    Irma Ross, DNP, APRN, CNP  [facility]

## 2022-10-13 NOTE — PATIENT INSTRUCTIONS
Plan:     --- MRI ordered  --- Keep appointment with PCP next week. Discuss muscle aches with statins and Bradycardia. HR 45 bpm.   --- Goal is to prevent future strokes.  Antiplatelet therapy:  - Continue preventive therapy: Asprin 81 mg , daily    Personalized modifiable risk factors that contribute to strokes   Elevated blood Pressure  BP: 135/81  - Goal <130/80  - Continue preventive therapy: Losartan 25 mg daily     Elevated cholesterol levels   LDL: from 06/24/22: 100  - Goal < 70 mg/dl  - Continue preventive therapy: Rosuvastatin 20 mg daily    Lifestyle  - Goal: Eat a well balanced meal and increased physical activity  Personalized goal: Try to eat fresh veggies 3 times a day. Continue working with physical therapy.    --- Plan on follow up in the Neurology Clinic in 6 months.  --- Please feel free to reach out if you have any further questions or concerns.  --- Seek immediate medical attention if an emergency arises or if your health becomes progressively worse.     For any acute neurologic deficits she was advised to  go directly to the hospital rather than call the clinic.    It was a pleasure to meet you today!

## 2022-10-13 NOTE — LETTER
10/13/2022         RE: Samia Ordoñez  [address]  [place] [address]        Dear Colleague,    Thank you for referring your patient, Samia Ordoñez, to the [facility] [place]. Please see a copy of my visit note below.    __________________________________________________________    ___________________________________  ESTABLISHED PATIENT NEUROLOGY NOTE    DATE OF VISIT: 10/13/2022  MRN: [id number]  PATIENT NAME: Samia Ordoñez  YOB: 1985      Chief Complaint: Patient presents with:  Stroke: 6 mo follow-up       SUBJECTIVE:                                                      HISTORY OF PRESENT ILLNESS:  Samia is here for follow up regarding stroke    Samia Ordoñez is a 37 year old female  with a history stroke. She follows with Dr. Ca in the clinic.  Per chart review Samia was hospitalized at [facility] in December 2021,  for left-sided MCA and MAYO watershed infarcts, felt to be embolic in etiology.  She has a history of migraines and presented to [facility] after she developed numbness and tingling in the right hand.  She was also noticing some clumsiness and neglect on that side. Imaging revealed left M1 stenosis and the strokes.  She was transferred to the [place] for further work-up.  She was also experiencing throbbing frontal headaches at the time.  Hemoglobin A1c was 5.3.  LDL was 151, and blood pressures 140s systolic.  TTE was unremarkable.  aPTT normal, negative beta-2 microglobulin and lupus panel.  RIRI was completed as outpatient, and showed a PFO.  It was also recommended that she see hematology for hypercoagulopathy work-up as outpatient, this revealed elevated protein S and heterozygous mutation of factor II, the latter of which notably not felt to be a significant finding per hematology note. She was discharged on aspirin and Brilinta, as well as Lipitor.  Plan is for transition to full-strength aspirin on 1.15.22.    She  stopped the Brilinta and continues on the aspirin 325mg.  She is also on the atorvastatin.      Today 10/13/22  Samia arrives to the clinic today for her 6 month follow.  She describes her stroke as: Having a trobbing headache and increased confusion.  She went to bed thinking she was just tired and woke up the next morning knowing something was wrong.  She went into the urgent care and they transferred her to [facility] for a stroke work-up.  She states that her right hand and arm slowly went numb and she lost motor coordination. This has fully resolved.     She is concerned over continued memory loss and choosing the wrong or opposite word when talking. She notes that this is worse when she is tired or under increased stress. This ebbs and flows, some weeks are much better than others.  Her  is constantly worried about her. They would like an MRI to make sure she has not had another stroke, despite no new symptoms. She feels this may help her  relax from his increased worry over her. 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    She states she is taking aspirin 81 mg daily, rosuvastatin 20 mg daily and losartan 25 mg daily.  She is tolerating the medications well with the exception of rosuvastatin, which causes mild muscle ache.  She reports that her primary care provider took her off of this medication for a few weeks to see if that would help.  She continued to have right hip pain. She was restarted on her statin and physical therapy was ordered. She has this once a week.    Samia also arrives today with asymptomatic bradycardia. She denies any chest pain, SOB or palpitations.  She does not feel that this is related to any new medication since she has a history of having a slowed HR. She states that she remembers having it as far back as when she was pregnant with her 15 year old son.    After reviewing her cardiac monitor, her heart rate dips into the 40s.  She states that she has a appointment with her primary care provider next week and will address this.     Current Prevention Medications:  Reported by patient  aspirin (ASA) 81 MG EC tablet- yes  rosuvastatin - 20 mg  Tablet daily - yes  and losartan 25 mg I tablet daily- yes    Perceived Side Effects: Yes, low grade muscle pain on statins    Medication Notes:   AED Medication Compliance:  compliant   Using a pill box:  Yes     Psycho-Social History: Samia Ordoñez currently lives [place]. Highest level of education 4 classes to finish BA in [employer] . Employment status: [employer].     Patient does not smoke, rare alcohol use, no recreational drug use.  Currently, patient denies feeling depressed, denies feeling anhedonia, denies suicidal  thoughts, and denies having feelings of excessive guilt/worthlessness.  We reviewed importance of mental and emotional wellbeing and impact on health.      Current Medications:   albuterol (PROAIR HFA/PROVENTIL HFA/VENTOLIN HFA) 108 (90 Base) MCG/ACT inhaler, Inhale 2 puffs into the lungs every 6 hours as needed for shortness of breath / dyspnea or wheezing  aspirin (ASA) 81 MG EC tablet, Take 1 tablet (81 mg) by mouth in the morning.  losartan (COZAAR) 25 MG tablet,   naproxen (NAPROSYN) 500 MG tablet, Take 500 mg by mouth 2 times daily as needed for moderate pain  rosuvastatin (CRESTOR) 20 MG tablet,   atorvastatin (LIPITOR) 40 MG tablet, Take 2 tablets (80 mg) by mouth every evening  ticagrelor (BRILINTA) 90 MG tablet, Take 1 tablet (90 mg) by mouth 2 times daily for 20 days (Patient not taking: Reported on 4/6/2022)    No current facility-administered medications on file prior to visit.    Past Medical History:   Patient  has no past medical history on file.  Surgical History:  She  has no past surgical history on file.  Family and Social History:  Reviewed, and she  reports that she has quit  smoking. She has never used smokeless tobacco. She reports current alcohol use.  Reviewed, and family history includes Cerebrovascular Disease in her father.    RECENT DIAGNOSTIC STUDIES:   Labs:    Coagulation studies:  Recent Labs   Lab Test 12/24/21  1641 12/22/21  1127   INR 1.00 0.95        Lipid panel:  Recent Labs   Lab Test 12/22/21  1127   CHOL 219*   HDL 46*   *   TRIG 112       HbA1C:  Recent Labs   Lab Test 12/22/21  1127   A1C 5.3       Troponin:  No lab results found.  No lab results found.  No lab results found.    Imaging:   CT HEAD W/O CONTRAST, CT HEAD PERFUSION WITH CONTRAST, CTA  HEAD NECK  WITH CONTRAST 12/24/2021 11:13 AM  Impression:   1. Evolving subacute infarctions in the left MCA distribution. No  acute cerebral infarct.  2. Suspected acute to subacute left pontine infarct.  3. Mild stenosis of the left M1 MCA segment, previously severe.   4. No stenosis of the cervical arteries.  5. No acute intracranial hemorrhage.  EXAM: MR BRAIN W/O AND W CONTRAST, MRA NECK (CAROTIDS) WO AND W CONTRAST, MRA BRAIN ([place] [facility]) WO CONTRAST  DATE/TIME: 12/22/2021 1:24 PM  IMPRESSION:  HEAD MRI:   1.  Patchy areas of restricted diffusion are seen within the medial aspect of the left cerebral hemisphere demonstrating an MCA MAYO watershed type distribution. No finding for hemorrhagic transformation.  2.  Mild paranasal sinus mucosal thickening with right maxillary sinus dependent air-fluid level compatible with active sinus disease.  HEAD MRA:   1.  Exam is degraded by motion artifact.  2.  Short segment severe stenosis M1 segment of the left MCA.  3.  Allowing for artifact, more distal middle and anterior cerebral arteries are patent.  NECK MRA:  1.  No significant stenosis either cervical carotid or vertebral system. No findings for dissection.     REVIEW OF SYSTEMS:                                                      10-point review of systems is negative except as mentioned above in  HPI.    EXAM:                                                      Physical Exam:   Vitals: /81   Pulse (!) 45   Wt 88.5 kg (195 lb 3.2 oz)   BMI 33.51 kg/m    BMI= Body mass index is 33.51 kg/m .  GENERAL: NAD.  HEENT: NC/AT.  CV: bradycardic  PULM: Non-labored breathing. LS clear  Neurologic:  MENTAL STATUS: Alert, attentive. Speech is fluent. Normal comprehension. Normal concentration. Adequate fund of knowledge.   CRANIAL NERVES: Visual fields intact to confrontation. Pupils equally, round and reactive to light. Facial sensation and movement normal. EOM full. Hearing intact to conversation. Trapezius strength intact. Palate moves symmetrically. Tongue midline.  MOTOR: 5/5 in proximal and distal muscle groups of upper and lower extremities. Tone and bulk normal.   DTRs: Intact and symmetric in biceps, BR, triceps and patellae.   SENSATION: Normal light touch throughout.   COORDINATION: Normal finger nose finger. Finger tapping normal. Knee heel shin normal.  STATION AND GAIT: Romberg Negative. Good postural reflexes. Casual gait and tandem Normal  right hand-dominant.      ASSESSMENT and PLAN:                                                      Assessment:    ICD-10-CM    1. Left MCA-MAYO watershed infarction  I63.89 MR Brain w/o & w Contrast        Samia Ordoñez is a 37 year old female with a history stroke. She follows with Dr. Ca in the clinic.  Per chart review, Samia was hospitalized at [facility] in December 2021,  for left-sided MCA and MAYO watershed infarcts, felt to be embolic in etiology.  She is concerned over continued memory loss worse with mental fatigue or increased stress.  We discussed reasonable expectations after a stroke and expected recovery time. She would like an MRI to r/o additional stroke to help relieve anxiety of her and her . I ordered an MRI. She has been taking medications as prescribed by her PCP to manage her vascular risk factors. She will discuss her  asymptomatic bradycardia with her PCP and medication next week.     We discussed interventions, will plan to see the patient back in 6 months. Samia understands and agrees with this plan.     Plan:   --- MRI ordered  --- Keep appointment with PCP next week. Discuss muscle aches with statins and Bradycardia. HR 45 bpm.   --- Goal is to prevent future strokes.  Antiplatelet therapy:  - Continue preventive therapy: Asprin 81 mg , daily    Personalized modifiable risk factors that contribute to strokes   Elevated blood Pressure  BP: 135/81  - Goal <130/80  - Continue preventive therapy: Losartan 25 mg daily     Elevated cholesterol levels   LDL: from 06/24/22: 100  - Goal < 70 mg/dl  - Continue preventive therapy: Rosuvastatin 20 mg daily    Lifestyle  - Goal: Eat a well balanced meal and increased physical activity  Personalized goal: Try to eat fresh veggies 3 times a day. Continue working with physical therapy.    --- Plan on follow up in the Neurology Clinic in 6 months.  --- Please feel free to reach out if you have any further questions or concerns.  --- Seek immediate medical attention if an emergency arises or if your health becomes progressively worse.     For any acute neurologic deficits she was advised to  go directly to the hospital rather than call the clinic.    It was a pleasure to meet you today!     Total Time: Total time spent for face to face visit, reviewing labs/imaging studies, counseling and coordination of care was: 1 Hour spent on the date of the encounter doing chart review, review of test results, patient visit, documentation and discussion with other provider(s)       This note was dictated using voice recognition software.  Any grammatical or context distortions are unintentional and inherent to the software.    Irma Ross, DNP, APRN, CNP  [facility]           Again, thank you for allowing me to participate in the care of your patient.        Sincerely,        Irma  DE Ross CNP

## 2022-10-31 ENCOUNTER — MYC MEDICAL ADVICE (OUTPATIENT)
Dept: NEUROLOGY | Facility: CLINIC | Age: 37
End: 2022-10-31

## 2022-11-01 NOTE — TELEPHONE ENCOUNTER
Retrieved MRI Report and images from [facility] (10/25/22), and pushed it to PACs.     Will route result to Provider.

## 2022-11-02 NOTE — TELEPHONE ENCOUNTER
Patient: Samia Ordoñez  DEBORAHB: 1985  Sex: Female  Phone: [phone]    CDI/[facility]oumya MRN: [id number]  Exam Date: 10/25/2022     EXAM: MR BRAIN WITHOUT AND WITH CONTRAST    CLINICAL INFORMATION: 37-year-old female with a history of border zone infarct.    COMPARISON: Brain MRI dated December 22, 2021.    TECHNICAL INFORMATION: Sagittal T1, axial T2, axial T2 FLAIR, axial T2*, pre and postcontrast axial T1, postcontrast coronal R cyst T, and axial diffusion-weighted MR images of the brain on an open 1.2 [place] magnet.    CONTRAST: 17 mL Dotarem IV.    SEDATION: None.    INTERPRETATION: Areas encephalomalacia involving the cortex of the left parietal and occipital regions and underlying white matter are consistent with a chronic infarct of the left MCA/PCA border zone, noting gyriform areas of intrinsically T1 hyperintense signal attributable to laminar necrosis. Similar, less extensive areas of cortical and s or al white matter encephalomalacia involve the left middle frontal gyrus. No areas of restricted diffusion are present to indicate acute infarct. There is no pathologic intracranial enhancement, extra-axial collection or other space-occupying lesion. T2*-weighted images show no evidence of intracranial blood product deposition.    Allowing for focal areas of encephalomalacia as described, the background brain volume is normal. Ventricular caliber and configuration are normal. Basal cisterns are patent. Cerebellar tonsils are at, and do not extend below, the foramen magnum. There is no sellar or parasellar mass. Flow-voids corresponding to the major intracranial vascular structures are preserved.    Globes and orbits are normal. There is mild mucosal thickening in the left greater than right maxillary sinuses. The marrow signal scalp soft tissues are normal.    Since December 2, 2021, infarcts in the left cerebral hemisphere have acquired a chronic appearance.    CONCLUSION: Evolution/maturation of  previously identified acute to subacute infarcts involving the left MAYO/MCA and MCA/PCA border zones, now having a chronic appearance. No acute stroke.    PDB        Electronically signed on 10/26/2022 12:06:00 AM by Santosh Kim M.D.

## 2022-11-02 NOTE — TELEPHONE ENCOUNTER
Samia would like MRI results- see below. Thanks!  Mary Ellen Ascencio CMA on 11/2/2022 at 10:16 AM

## 2022-11-02 NOTE — TELEPHONE ENCOUNTER
Please let Samia know that her MRI shows normal changes in the areas of her prior strokes.  No new findings.    Thank you,  Dr. Ca

## 2023-01-23 ENCOUNTER — TELEPHONE (OUTPATIENT)
Dept: NEUROLOGY | Facility: CLINIC | Age: 38
End: 2023-01-23
Payer: COMMERCIAL

## 2023-01-23 DIAGNOSIS — Z86.73 HISTORY OF STROKE: ICD-10-CM

## 2023-01-23 DIAGNOSIS — Z79.899 ENCOUNTER FOR LONG-TERM (CURRENT) USE OF MEDICATIONS: ICD-10-CM

## 2023-01-24 NOTE — TELEPHONE ENCOUNTER
Signed Prescriptions:                        Disp   Refills    aspirin (ASA) 81 MG EC tablet              90 tab*1        Sig: Take 1 tablet (81 mg) by mouth daily  Authorizing Provider: IRMA YO  Ordering User: STEPHANIE PUENTES    Refill approved per RN review of chart notes and refill hx. NOV 4/13/23 with Irma Puentes RN, BSN  [facility]

## 2023-04-12 NOTE — PROGRESS NOTES
__________________________________________________________    ___________________________________  ESTABLISHED PATIENT NEUROLOGY NOTE    DATE OF VISIT: 4/13/2023  MRN: [id number]  PATIENT NAME: Samia Ordoñez  YOB: 1985    Chief Complaint: Patient presents with:  Stroke: Patient reports she had another stoke on 2/20/23. Patient is having brain surgery next week.    SUBJECTIVE:                                                      HISTORY OF PRESENT ILLNESS:  Samia is here for follow up regarding stroke     Samia Ordoñez is a 37 year old female  with a history stroke. She follows with Dr. Ca in the clinic.  Per chart review Samia was hospitalized at [facility] in December 2021,  for left-sided MCA and MAYO watershed infarcts, felt to be embolic in etiology.  She has a history of migraines and presented to [facility] after she developed numbness and tingling in the right hand.  She was also noticing some clumsiness and neglect on that side. Imaging revealed left M1 stenosis and the strokes.  She was transferred to the [place] for further work-up.  She was also experiencing throbbing frontal headaches at the time.  Hemoglobin A1c was 5.3.  LDL was 151, and blood pressures 140s systolic.  TTE was unremarkable.  aPTT normal, negative beta-2 microglobulin and lupus panel.  RIRI was completed as outpatient, and showed a PFO.  It was also recommended that she see hematology for hypercoagulopathy work-up as outpatient, this revealed elevated protein S and heterozygous mutation of factor II, the latter of which notably not felt to be a significant finding per hematology note. She was discharged on aspirin and Brilinta, as well as Lipitor.  Plan is for transition to full-strength aspirin on 1.15.22.     She stopped the Brilinta and continues on the aspirin 325mg.  She is also on the atorvastatin.       10/13/22: Samia arrives to the clinic today for her 6 month follow.  She describes her stroke  as: Having a throbbing headache and increased confusion.  She went to bed thinking she was just tired and woke up the next morning knowing something was wrong.  She went into the urgent care and they transferred her to [facility] for a stroke work-up.  She states that her right hand and arm slowly went numb and she lost motor coordination. This has fully resolved.      She is concerned over continued memory loss and choosing the wrong or opposite word when talking. She notes that this is worse when she is tired or under increased stress. This ebbs and flows, some weeks are much better than others.  Her  is constantly worried about her. They would like an MRI to make sure she has not had another stroke, despite no new symptoms. She feels this may help her  relax from his increased worry over her. 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     She states she is taking aspirin 81 mg daily, rosuvastatin 20 mg daily and losartan 25 mg daily.  She is tolerating the medications well with the exception of rosuvastatin, which causes mild muscle ache.  She reports that her primary care provider took her off of this medication for a few weeks to see if that would help.  She continued to have right hip pain. She was restarted on her statin and physical therapy was ordered. She has this once a week.     Samia also arrives today with asymptomatic bradycardia. She denies any chest pain, SOB or palpitations.  She does not feel that this is related to any new medication since she has a history of having a slowed HR. She states that she remembers having it as far back as when she was pregnant with her 15 year old son.   After reviewing her cardiac monitor, her heart rate dips into the 40s.  She states that she has a appointment with her primary care provider next week and will address this.    02/20/23: ED-  Per ED note: was admitted to [facility] for vision changes in her right field of vision speech finding difficulty as well as incoordination of the right. Patient brought to the emergency room. CT scan of head showed old infarct in the left frontal and parietal occipital region. CTA showed segmental stenosis/occlusion of the M1 segment with appearance looking 50 moyamoya. Patient was given tenecteplase and has had resolution of symptoms with NIH stroke scale of 1-2, predominantly for right visual field problems which appear to be an inferior quadrantanopsia/difficulty with clarity in that area. Patient restarted on dual antiplatelet therapy with PFO closure. Does have elevated LDL at 113 but has been through 4 different statin agents with myalgias occurring minutes ago. Cardiology other agents. Patient will get EEG before discharge to make sure no evidence of seizures which could potentially explain her symptomatology. She otherwise will follow up with her neurologist. We will also plan neurosurgical consultation with Dr. Hayes regarding surgical evaluation for moyamoya syndrome. Echocardiogram-bubble study was done which showed no evidence for recurrence of PFO and no evidence of thrombus.    04/13/23: Samia arrives to the clinic today for her 6-month follow-up.  She reports that on Presidents' Day she presented to [facility] for strokelike symptoms.  She had visual changes described as seeing colors wheels out of her right eye for approximately 1 hour before her vision blacked out.  She also had some right-sided weakness/incoordination.  She reports that her symptoms have fully resolved back to her baseline.  Baseline: 25% loss of right peripheral vision, difficulty with word finding.  She denies any dysarthria or dysphagia.  No focal numbness tingling or weakness. No therapies scheduled until after surgery next week    In December she had a PFO closure and was on Plavix for 6 months.  At her recent  hospitalization moyamoya syndrome was suspected.  March 21 she had an angiogram to confirm.  She is scheduled for revascularization surgery on April 20.  She stopped taking her Plavix today in preparation for surgery.  She can continue her aspirin until the morning before her surgery per neurosurgery.    Samia continues to take losartan for blood pressure.  Blood pressure in clinic today is 130/92.  She states that she has increased stressors in addition to her health.  Her son was assaulted at school and is now homeschooled.  Her  also was laid off work last week.  She will continue to monitor her blood pressure.  Rosuvastatin was stopped by cardiology after PFO closure.  She is following the recommendations on DAPT therapy from her neurosurgeon at this time.     Current Prevention Medications:    aspirin (ASA) 81 MG EC tablet- yes  rosuvastatin - 20 mg  Tablet daily - no, stopped by cardiology   losartan 25 mg I tablet daily- yes  Plavix- stopped today in preparation for surgery    Perceived Side Effects: No    Medication Notes:   AED Medication Compliance:  compliant      Psycho-Social History: Samia Ordoñez currently lives [place]. Highest level of education 4 classes to finish BA in [employer] . Employment status: [employer].      Patient does not smoke, rare alcohol use, no recreational drug use.  Currently, patient denies feeling depressed, denies feeling anhedonia, denies suicidal  thoughts, and denies having feelings of excessive guilt/worthlessness.  We reviewed importance of mental and emotional wellbeing and impact on health.      Current Medications:   aspirin (ASA) 81 MG EC tablet, Take 1 tablet (81 mg) by mouth daily  clopidogrel (PLAVIX) 75 MG tablet, Take 75 mg by mouth  losartan (COZAAR) 25 MG tablet,   naproxen (NAPROSYN) 500 MG tablet, Take 500 mg by mouth 2 times daily as needed for moderate pain  albuterol (PROAIR HFA/PROVENTIL HFA/VENTOLIN HFA) 108 (90 Base) MCG/ACT inhaler,  Inhale 2 puffs into the lungs every 6 hours as needed for shortness of breath / dyspnea or wheezing  rosuvastatin (CRESTOR) 20 MG tablet,     No current facility-administered medications on file prior to visit.    Past Medical History:   Patient  has no past medical history on file.  Surgical History:  She  has no past surgical history on file.  Family and Social History:  Reviewed, and she  reports that she has quit smoking. She has never used smokeless tobacco. She reports current alcohol use.  Reviewed, and family history includes Cerebrovascular Disease in her father.    RECENT DIAGNOSTIC STUDIES:   Labs:    Coagulation studies:  Recent Labs   Lab Test 12/24/21  1641 12/22/21  1127   INR 1.00 0.95        Lipid panel:  Recent Labs   Lab Test 12/22/21  1127   CHOL 219*   HDL 46*   *   TRIG 112       HbA1C:  Recent Labs   Lab Test 12/22/21  1127   A1C 5.3       Troponin:  No lab results found.  No lab results found.  No lab results found.    Imaging:   EXAM: MR ANGIO HEAD BRAIN WO, MR HEAD BRAIN WO   LOCATION: [facility] [[email or web address]] MEDICAL IMAGING   DATE/TIME: 2/21/2023 7:11 PM  HEAD MRI:   1.  No acute or subacute ischemic change.   2.  Remote infarction of the left occipital, temporal, parietal and frontal lobes with associated encephalomalacia.     HEAD MRA:   1.  Left M1 occlusion as seen on comparison CT angiogram. Minimal to no flow related enhancement of the distal left MCA vasculature which is diminutive in appearance. The distal MCA vasculature is better appreciated on the CT angiogram.   2.  No additional large vessel occlusion or hemodynamically significant stenosis within the intracranial circulation.    MR brain with and without contrast 11/1/2022  Conclusion: Evolution/maturation of previously identified acute to subacute infarcts involving the left MAYO/MCA and MCA/PCA border zones, now having a chronic appearance    CT HEAD W/O CONTRAST, CT HEAD PERFUSION WITH CONTRAST, CTA  HEAD NECK  WITH CONTRAST 12/24/2021  "11:13 AM  Impression:   1. Evolving subacute infarctions in the left MCA distribution. No  acute cerebral infarct.  2. Suspected acute to subacute left pontine infarct.  3. Mild stenosis of the left M1 MCA segment, previously severe.   4. No stenosis of the cervical arteries.  5. No acute intracranial hemorrhage.    EXAM: MR BRAIN W/O AND W CONTRAST, MRA NECK (CAROTIDS) WO AND W CONTRAST, MRA BRAIN ([place] OF [place]) WO CONTRAST  DATE/TIME: 12/22/2021 1:24 PM  IMPRESSION:  HEAD MRI:   1.  Patchy areas of restricted diffusion are seen within the medial aspect of the left cerebral hemisphere demonstrating an MCA MAYO watershed type distribution. No finding for hemorrhagic transformation.  2.  Mild paranasal sinus mucosal thickening with right maxillary sinus dependent air-fluid level compatible with active sinus disease.  HEAD MRA:   1.  Exam is degraded by motion artifact.  2.  Short segment severe stenosis M1 segment of the left MCA.  3.  Allowing for artifact, more distal middle and anterior cerebral arteries are patent.  NECK MRA:  1.  No significant stenosis either cervical carotid or vertebral system. No findings for dissection.     REVIEW OF SYSTEMS:                                                      10-point review of systems is negative except as mentioned above in HPI.    EXAM:                                                      Physical Exam:   Vitals: BP (!) 130/92   Pulse 75   Ht 1.626 m (5' 4\")   Wt 91 kg (200 lb 9.6 oz)   BMI 34.43 kg/m    BMI= Body mass index is 34.43 kg/m .  GENERAL: NAD.  HEENT: NC/AT.  PULM: Non-labored breathing.   Neurologic:  MENTAL STATUS: Alert, attentive. Speech is fluent. Normal comprehension. Normal concentration. Adequate fund of knowledge.   CRANIAL NERVES: Visual fields intact to confrontation. Pupils equally, round and reactive to light. Facial sensation and movement normal. EOM full. Hearing intact to conversation. Trapezius strength intact. Palate moves " symmetrically. Tongue midline.  MOTOR: 5/5 in proximal and distal muscle groups of upper and lower extremities. Tone and bulk normal.   DTRs: Intact and symmetric in biceps, BR and patellae.   SENSATION: Normal light touch throughout.   COORDINATION: Normal finger nose finger. Finger tapping normal. Knee heel shin normal.  STATION AND GAIT: Romberg Negative. Good postural reflexes. Casual gait and tandem Normal  right hand-dominant.      ASSESSMENT and PLAN:                                                      Assessment:    ICD-10-CM    1. Acute ischemic stroke (H)  I63.9         Samia Ordoñez is a 37 year old female with a history stroke. She follows with Dr. Ca in the clinic.  Per chart review, Samia was hospitalized at [facility] in December 2021,  for left-sided MCA and MAYO watershed infarcts, felt to be embolic in etiology.  Samia had PFO closure in December/2022 and was on dual antiplatelet therapy x 6 months.  On Presidents' Day she was rehospitalized for strokelike symptoms and diagnosed with moyamoya syndrome.  She is following with neurosurgery for revascularization on April 20.  We discussed interventions outlined below and will plan to have the patient follow back in 6 months with Dr. Ca.  Samia understands and agrees with this plan.    Plan:     Antiplatelet therapy:  - Follow recommendation from Neurosurgeon on when to start plavix and aspirin.     Vascular risk factors   Elevated blood Pressure  BP: 130/92  - Goal <130/80  - Continue preventive therapy: Losartan 25 mg daily     --- Plan on follow up in the Neurology Clinic in 6 months.  --- Please feel free to reach out if you have any further questions or concerns.  --- Seek immediate medical attention if an emergency arises or if your health becomes progressively worse.      For any acute neurologic deficits she was advised to  go directly to the hospital rather than call the clinic.     It was a pleasure to meet you today!       Total  Time: Total time spent for face to face visit, reviewing labs/imaging studies, counseling and coordination of care was: 45 Minutes spent on the date of the encounter doing chart review, review of test results, patient visit, documentation and discussion with other provider(s)       This note was dictated using voice recognition software.  Any grammatical or context distortions are unintentional and inherent to the software.    Irma Ross, DNP, APRN, CNP  [facility]

## 2023-04-13 ENCOUNTER — OFFICE VISIT (OUTPATIENT)
Dept: NEUROLOGY | Facility: CLINIC | Age: 38
End: 2023-04-13
Payer: COMMERCIAL

## 2023-04-13 VITALS
WEIGHT: 200.6 LBS | HEART RATE: 75 BPM | DIASTOLIC BLOOD PRESSURE: 92 MMHG | BODY MASS INDEX: 34.25 KG/M2 | SYSTOLIC BLOOD PRESSURE: 130 MMHG | HEIGHT: 64 IN

## 2023-04-13 DIAGNOSIS — I63.9 ACUTE ISCHEMIC STROKE (H): Primary | ICD-10-CM

## 2023-04-13 PROCEDURE — 99215 OFFICE O/P EST HI 40 MIN: CPT

## 2023-04-13 RX ORDER — CLOPIDOGREL BISULFATE 75 MG/1
75 TABLET ORAL
COMMUNITY
Start: 2022-12-01

## 2023-04-13 NOTE — NURSING NOTE
Chief Complaint   Patient presents with     Stroke     Patient reports she had another stoke on 2/20/23. Patient is having brain surgery next week.     Micaela Hicks on 4/13/2023 at 7:53 AM

## 2023-04-13 NOTE — PATIENT INSTRUCTIONS
Plan:     Antiplatelet therapy:  - Follow recommendation from Neurosurgeon on when to start plavix and aspirin.     Vascular risk factors   Elevated blood Pressure  BP: 130/92  - Goal <130/80  - Continue preventive therapy: Losartan 25 mg daily     --- Plan on follow up in the Neurology Clinic in 6 months with Dr. Ca  --- Please feel free to reach out if you have any further questions or concerns.  --- Seek immediate medical attention if an emergency arises or if your health becomes progressively worse.      For any acute neurologic deficits she was advised to  go directly to the hospital rather than call the clinic.     It was a pleasure to meet you today!

## 2023-04-13 NOTE — LETTER
4/13/2023         RE: Samia Ordoñez  [address]  [place] [address]        Dear Colleague,    Thank you for referring your patient, Samia Ordoñez, to the [facility] NEUROLOGY CLINIC [place]. Please see a copy of my visit note below.    __________________________________________________________    ___________________________________  ESTABLISHED PATIENT NEUROLOGY NOTE    DATE OF VISIT: 4/13/2023  MRN: [id number]  PATIENT NAME: Samia Ordoñez  YOB: 1985    Chief Complaint: Patient presents with:  Stroke: Patient reports she had another stoke on 2/20/23. Patient is having brain surgery next week.    SUBJECTIVE:                                                      HISTORY OF PRESENT ILLNESS:  Samia is here for follow up regarding stroke     Samia Ordoñez is a 37 year old female  with a history stroke. She follows with Dr. Ca in the clinic.  Per chart review Samia was hospitalized at [facility] in December 2021,  for left-sided MCA and MAYO watershed infarcts, felt to be embolic in etiology.  She has a history of migraines and presented to [facility] after she developed numbness and tingling in the right hand.  She was also noticing some clumsiness and neglect on that side. Imaging revealed left M1 stenosis and the strokes.  She was transferred to the [facility] for further work-up.  She was also experiencing throbbing frontal headaches at the time.  Hemoglobin A1c was 5.3.  LDL was 151, and blood pressures 140s systolic.  TTE was unremarkable.  aPTT normal, negative beta-2 microglobulin and lupus panel.  RIRI was completed as outpatient, and showed a PFO.  It was also recommended that she see hematology for hypercoagulopathy work-up as outpatient, this revealed elevated protein S and heterozygous mutation of factor II, the latter of which notably not felt to be a significant finding per hematology note. She was discharged on aspirin and Brilinta, as well as  Lipitor.  Plan is for transition to full-strength aspirin on 1.15.22.     She stopped the Brilinta and continues on the aspirin 325mg.  She is also on the atorvastatin.       10/13/22: Samia arrives to the clinic today for her 6 month follow.  She describes her stroke as: Having a throbbing headache and increased confusion.  She went to bed thinking she was just tired and woke up the next morning knowing something was wrong.  She went into the urgent care and they transferred her to [facility] for a stroke work-up.  She states that her right hand and arm slowly went numb and she lost motor coordination. This has fully resolved.      She is concerned over continued memory loss and choosing the wrong or opposite word when talking. She notes that this is worse when she is tired or under increased stress. This ebbs and flows, some weeks are much better than others.  Her  is constantly worried about her. They would like an MRI to make sure she has not had another stroke, despite no new symptoms. She feels this may help her  relax from his increased worry over her. 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     She states she is taking aspirin 81 mg daily, rosuvastatin 20 mg daily and losartan 25 mg daily.  She is tolerating the medications well with the exception of rosuvastatin, which causes mild muscle ache.  She reports that her primary care provider took her off of this medication for a few weeks to see if that would help.  She continued to have right hip pain. She was restarted on her statin and physical therapy was ordered. She has this once a week.     Samia also arrives today with asymptomatic bradycardia. She denies any chest pain, SOB or palpitations.  She does not feel that this is related to any new medication since she has a history of having a slowed HR. She states that she  remembers having it as far back as when she was pregnant with her 15 year old son.   After reviewing her cardiac monitor, her heart rate dips into the 40s.  She states that she has a appointment with her primary care provider next week and will address this.    02/20/23: ED- Per ED note: was admitted to [facility] for vision changes in her right field of vision speech finding difficulty as well as incoordination of the right. Patient brought to the emergency room. CT scan of head showed old infarct in the left frontal and parietal occipital region. CTA showed segmental stenosis/occlusion of the M1 segment with appearance looking 50 moyamoya. Patient was given tenecteplase and has had resolution of symptoms with NIH stroke scale of 1-2, predominantly for right visual field problems which appear to be an inferior quadrantanopsia/difficulty with clarity in that area. Patient restarted on dual antiplatelet therapy with PFO closure. Does have elevated LDL at 113 but has been through 4 different statin agents with myalgias occurring minutes ago. Cardiology other agents. Patient will get EEG before discharge to make sure no evidence of seizures which could potentially explain her symptomatology. She otherwise will follow up with her neurologist. We will also plan neurosurgical consultation with Dr. Hayes regarding surgical evaluation for moyamoya syndrome. Echocardiogram-bubble study was done which showed no evidence for recurrence of PFO and no evidence of thrombus.    04/13/23: Samia arrives to the clinic today for her 6-month follow-up.  She reports that on Presidents' Day she presented to [facility] for strokelike symptoms.  She had visual changes described as seeing colors wheels out of her right eye for approximately 1 hour before her vision blacked out.  She also had some right-sided weakness/incoordination.  She reports that her symptoms have fully resolved back to her baseline.  Baseline: 25% loss of  right peripheral vision, difficulty with word finding.  She denies any dysarthria or dysphagia.  No focal numbness tingling or weakness. No therapies scheduled until after surgery next week    In December she had a PFO closure and was on Plavix for 6 months.  At her recent hospitalization moyamoya syndrome was suspected.  March 21 she had an angiogram to confirm.  She is scheduled for revascularization surgery on April 20.  She stopped taking her Plavix today in preparation for surgery.  She can continue her aspirin until the morning before her surgery per neurosurgery.    Samia continues to take losartan for blood pressure.  Blood pressure in clinic today is 130/92.  She states that she has increased stressors in addition to her health.  Her son was assaulted at school and is now homeschooled.  Her  also was laid off work last week.  She will continue to monitor her blood pressure.  Rosuvastatin was stopped by cardiology after PFO closure.  She is following the recommendations on DAPT therapy from her neurosurgeon at this time.     Current Prevention Medications:    aspirin (ASA) 81 MG EC tablet- yes  rosuvastatin - 20 mg  Tablet daily - no, stopped by cardiology   losartan 25 mg I tablet daily- yes  Plavix- stopped today in preparation for surgery    Perceived Side Effects: No    Medication Notes:   AED Medication Compliance:  compliant      Psycho-Social History: Samia Ordoñez currently lives [place]. Highest level of education 4 classes to finish BA in Accounting . Employment status: [employer].      Patient does not smoke, rare alcohol use, no recreational drug use.  Currently, patient denies feeling depressed, denies feeling anhedonia, denies suicidal  thoughts, and denies having feelings of excessive guilt/worthlessness.  We reviewed importance of mental and emotional wellbeing and impact on health.      Current Medications:   aspirin (ASA) 81 MG EC tablet, Take 1 tablet (81 mg) by mouth  daily  clopidogrel (PLAVIX) 75 MG tablet, Take 75 mg by mouth  losartan (COZAAR) 25 MG tablet,   naproxen (NAPROSYN) 500 MG tablet, Take 500 mg by mouth 2 times daily as needed for moderate pain  albuterol (PROAIR HFA/PROVENTIL HFA/VENTOLIN HFA) 108 (90 Base) MCG/ACT inhaler, Inhale 2 puffs into the lungs every 6 hours as needed for shortness of breath / dyspnea or wheezing  rosuvastatin (CRESTOR) 20 MG tablet,     No current facility-administered medications on file prior to visit.    Past Medical History:   Patient  has no past medical history on file.  Surgical History:  She  has no past surgical history on file.  Family and Social History:  Reviewed, and she  reports that she has quit smoking. She has never used smokeless tobacco. She reports current alcohol use.  Reviewed, and family history includes Cerebrovascular Disease in her father.    RECENT DIAGNOSTIC STUDIES:   Labs:    Coagulation studies:  Recent Labs   Lab Test 12/24/21  1641 12/22/21  1127   INR 1.00 0.95        Lipid panel:  Recent Labs   Lab Test 12/22/21  1127   CHOL 219*   HDL 46*   *   TRIG 112       HbA1C:  Recent Labs   Lab Test 12/22/21  1127   A1C 5.3       Troponin:  No lab results found.  No lab results found.  No lab results found.    Imaging:   EXAM: MR ANGIO HEAD BRAIN WO, MR HEAD BRAIN WO   LOCATION: [facility] MEDICAL IMAGING   DATE/TIME: 2/21/2023 7:11 PM  HEAD MRI:   1.  No acute or subacute ischemic change.   2.  Remote infarction of the left occipital, temporal, parietal and frontal lobes with associated encephalomalacia.     HEAD MRA:   1.  Left M1 occlusion as seen on comparison CT angiogram. Minimal to no flow related enhancement of the distal left MCA vasculature which is diminutive in appearance. The distal MCA vasculature is better appreciated on the CT angiogram.   2.  No additional large vessel occlusion or hemodynamically significant stenosis within the intracranial circulation.    MR brain with and without contrast  "11/1/2022  Conclusion: Evolution/maturation of previously identified acute to subacute infarcts involving the left MAYO/MCA and MCA/PCA border zones, now having a chronic appearance    CT HEAD W/O CONTRAST, CT HEAD PERFUSION WITH CONTRAST, CTA  HEAD NECK  WITH CONTRAST 12/24/2021 11:13 AM  Impression:   1. Evolving subacute infarctions in the left MCA distribution. No  acute cerebral infarct.  2. Suspected acute to subacute left pontine infarct.  3. Mild stenosis of the left M1 MCA segment, previously severe.   4. No stenosis of the cervical arteries.  5. No acute intracranial hemorrhage.    EXAM: MR BRAIN W/O AND W CONTRAST, MRA NECK (CAROTIDS) WO AND W CONTRAST, MRA BRAIN ([place] [facility]) WO CONTRAST  DATE/TIME: 12/22/2021 1:24 PM  IMPRESSION:  HEAD MRI:   1.  Patchy areas of restricted diffusion are seen within the medial aspect of the left cerebral hemisphere demonstrating an MCA MAYO watershed type distribution. No finding for hemorrhagic transformation.  2.  Mild paranasal sinus mucosal thickening with right maxillary sinus dependent air-fluid level compatible with active sinus disease.  HEAD MRA:   1.  Exam is degraded by motion artifact.  2.  Short segment severe stenosis M1 segment of the left MCA.  3.  Allowing for artifact, more distal middle and anterior cerebral arteries are patent.  NECK MRA:  1.  No significant stenosis either cervical carotid or vertebral system. No findings for dissection.     REVIEW OF SYSTEMS:                                                      10-point review of systems is negative except as mentioned above in HPI.    EXAM:                                                      Physical Exam:   Vitals: BP (!) 130/92   Pulse 75   Ht 1.626 m (5' 4\")   Wt 91 kg (200 lb 9.6 oz)   BMI 34.43 kg/m    BMI= Body mass index is 34.43 kg/m .  GENERAL: NAD.  HEENT: NC/AT.  PULM: Non-labored breathing.   Neurologic:  MENTAL STATUS: Alert, attentive. Speech is fluent. Normal comprehension. " Normal concentration. Adequate fund of knowledge.   CRANIAL NERVES: Visual fields intact to confrontation. Pupils equally, round and reactive to light. Facial sensation and movement normal. EOM full. Hearing intact to conversation. Trapezius strength intact. Palate moves symmetrically. Tongue midline.  MOTOR: 5/5 in proximal and distal muscle groups of upper and lower extremities. Tone and bulk normal.   DTRs: Intact and symmetric in biceps, BR and patellae.   SENSATION: Normal light touch throughout.   COORDINATION: Normal finger nose finger. Finger tapping normal. Knee heel shin normal.  STATION AND GAIT: Romberg Negative. Good postural reflexes. Casual gait and tandem Normal  right hand-dominant.      ASSESSMENT and PLAN:                                                      Assessment:    ICD-10-CM    1. Acute ischemic stroke (H)  I63.9         Samia Ordoñez is a 37 year old female with a history stroke. She follows with Dr. Ca in the clinic.  Per chart review, Samia was hospitalized at [facility] in December 2021,  for left-sided MCA and MAYO watershed infarcts, felt to be embolic in etiology.  Samia had PFO closure in December/2022 and was on dual antiplatelet therapy x 6 months.  On Presidents' Day she was rehospitalized for strokelike symptoms and diagnosed with moyamoya syndrome.  She is following with neurosurgery for revascularization on April 20.  We discussed interventions outlined below and will plan to have the patient follow back in 6 months with Dr. Ca.  Samia understands and agrees with this plan.    Plan:     Antiplatelet therapy:  - Follow recommendation from Neurosurgeon on when to start plavix and aspirin.     Vascular risk factors   Elevated blood Pressure  BP: 130/92  - Goal <130/80  - Continue preventive therapy: Losartan 25 mg daily     --- Plan on follow up in the Neurology Clinic in 6 months.  --- Please feel free to reach out if you have any further questions or concerns.  ---  Seek immediate medical attention if an emergency arises or if your health becomes progressively worse.      For any acute neurologic deficits she was advised to  go directly to the hospital rather than call the clinic.     It was a pleasure to meet you today!       Total Time: Total time spent for face to face visit, reviewing labs/imaging studies, counseling and coordination of care was: 45 Minutes spent on the date of the encounter doing chart review, review of test results, patient visit, documentation and discussion with other provider(s)       This note was dictated using voice recognition software.  Any grammatical or context distortions are unintentional and inherent to the software.    Irma Ross, MARYBEL, APRN, CNP  [facility]       Again, thank you for allowing me to participate in the care of your patient.        Sincerely,        DE Landrum CNP

## 2023-07-25 ENCOUNTER — OFFICE VISIT (OUTPATIENT)
Dept: NEUROLOGY | Facility: CLINIC | Age: 38
End: 2023-07-25
Payer: COMMERCIAL

## 2023-07-25 ENCOUNTER — TELEPHONE (OUTPATIENT)
Dept: NEUROLOGY | Facility: CLINIC | Age: 38
End: 2023-07-25

## 2023-07-25 VITALS — DIASTOLIC BLOOD PRESSURE: 75 MMHG | SYSTOLIC BLOOD PRESSURE: 130 MMHG | HEART RATE: 92 BPM

## 2023-07-25 DIAGNOSIS — Z79.899 ENCOUNTER FOR LONG-TERM (CURRENT) USE OF MEDICATIONS: ICD-10-CM

## 2023-07-25 DIAGNOSIS — Z86.73 HISTORY OF STROKE: ICD-10-CM

## 2023-07-25 DIAGNOSIS — H53.9 VISION CHANGES: ICD-10-CM

## 2023-07-25 DIAGNOSIS — G43.109 MIGRAINE WITH AURA AND WITHOUT STATUS MIGRAINOSUS, NOT INTRACTABLE: Primary | ICD-10-CM

## 2023-07-25 PROCEDURE — 99214 OFFICE O/P EST MOD 30 MIN: CPT | Performed by: PSYCHIATRY & NEUROLOGY

## 2023-07-25 RX ORDER — ASPIRIN 81 MG/1
81 TABLET ORAL DAILY
Qty: 90 TABLET | Refills: 1 | Status: SHIPPED | OUTPATIENT
Start: 2023-07-25

## 2023-07-25 RX ORDER — TOPIRAMATE 25 MG/1
TABLET, FILM COATED ORAL
Qty: 120 TABLET | Refills: 4 | Status: SHIPPED | OUTPATIENT
Start: 2023-07-25 | End: 2023-09-29

## 2023-07-25 RX ORDER — DIVALPROEX SODIUM 500 MG/1
TABLET, EXTENDED RELEASE ORAL
COMMUNITY
Start: 2023-05-08 | End: 2023-07-25 | Stop reason: ALTCHOICE

## 2023-07-25 NOTE — PATIENT INSTRUCTIONS
Plan:  -- Let's stop the Depakote and start you on Topamax.  -- Start by taking 1 tablet (25mg) in the evening for 1 week, then increase to twice daily dosing for 1 week, then increase the evening dose to 50mg and continue the 25mg in the morning for 1 week and finally 50mg twice daily.  -- I think you can probably switch to a full-strength aspirin but I would check back in with your cardiologist and neurosurgeon, as we discussed.  -- Return to neurology clinic in 3-4 months.

## 2023-07-25 NOTE — LETTER
7/25/2023         RE: Samia Ordoñez  [address]  [place] [address]        Dear Colleague,    Thank you for referring your patient, Samia Ordoñez, to the [facility]UROLOGY CLINIC [place]. Please see a copy of my visit note below.    ESTABLISHED PATIENT NEUROLOGY NOTE    DATE OF VISIT: 7/25/2023  MRN: [id number]  PATIENT NAME: Samia Ordoñez  YOB: 1985    Chief Complaint   Patient presents with     Stroke     On Depakote for aura headache; will cause sinus/vision change  Currently taking Plavix and Asprin   April 20; craniotomy      SUBJECTIVE:                                                      HISTORY OF PRESENT ILLNESS:  Samia is here for follow up regarding stroke and headaches.  Imaging showed findings consistent with moyamoya.  She has additional history of PFO closure.  And then more recently had a craniotomy, s/p encephalodurosynangiosis.     She is currently on aspirin and Plavix.  She was started on Depakote for headache prevention a couple of months ago by the consulting neurologist at [facility].  She was describing an aura of visual changes at that time.  She was evaluated for seizure, EEG was negative.  Samia says she was worried that she was having  another stroke.  Since starting the Depakote she still has some of her aura symptoms around the time of menstruation.  She is having problems with fogginess on the medication, decreased sex drive.  She would like to be on something else if possible.    She does continue to work with Neurosurgery and is waiting to hear back about aspirin/Plavix.     CURRENT MEDICATIONS:   clopidogrel (PLAVIX) 75 MG tablet, Take 75 mg by mouth  losartan (COZAAR) 25 MG tablet,   naproxen (NAPROSYN) 500 MG tablet, Take 500 mg by mouth 2 times daily as needed for moderate pain  albuterol (PROAIR HFA/PROVENTIL HFA/VENTOLIN HFA) 108 (90 Base) MCG/ACT inhaler, Inhale 2 puffs into the lungs every 6 hours as needed for shortness of  breath / dyspnea or wheezing    No current facility-administered medications on file prior to visit.      RECENT DIAGNOSTIC STUDIES:   Labs: No results found for any visits on 07/25/23.    Imaging:   MRI/MRA (5.10.23):  HEAD MRI:   1.  No acute/subacute infarct, mass, hemorrhage or hydrocephalus.   2.  Left temporal/parietal craniotomy with a thin cleft of extra-axial fluid deep to the craniotomy measuring 5 mm in thickness.   3.  Normal infarcts left cerebral hemisphere as described.     HEAD MRA:   1.  Chronic occlusion of the left M1 segment compatible with a history of moyamoya.   2.  Interval left EC to IC bypass.   3.  No new branch occlusion, aneurysm or AVM.     NECK MRA:   No measurable stenosis or dissection.     Electroencephalogram (5.10.23):  Clinical Correlation and Recommendations:   This EEG demonstrates focal abnormalities of the left temporal   region.  Focal abnormalities imply localized cortical dysfunction   and may be related to structural, vascular, or other   epileptogenic pathologies.  Clinical correlation is required.     REVIEW OF SYSTEMS:                                                      10-point review of systems is negative except as mentioned above in HPI.    EXAM:                                                      Physical Exam:   Vitals: /75   Pulse 92   BMI= There is no height or weight on file to calculate BMI.  GENERAL: NAD.  HEENT: NC/AT.  CV: RRR. S1, S2.   NECK: No bruits.  PULM: Non-labored breathing.   Neurologic:  MENTAL STATUS: Alert, attentive. Speech is fluent. Normal comprehension. Normal concentration. Adequate fund of knowledge.   CRANIAL NERVES: Discs flat. Visual fields intact to confrontation. Pupils equally, round and reactive to light. Facial sensation and movement normal. EOM full. Hearing intact to conversation.Trapezius strength intact. Palate moves symmetrically. Tongue midline.  MOTOR: 5/5 in proximal and distal muscle groups of upper and lower  extremities. Tone and bulk normal.   DTRs: Intact and symmetric in biceps, BR, patellae.  Babinski down-going bilaterally.   SENSATION: Normal light touch throughout.  COORDINATION: Normal finger nose finger. Finger tapping normal. Knee heel shin normal.  STATION AND GAIT: Romberg Negative. Good postural reflexes. Casual gait is normal.    ASSESSMENT and PLAN:                                                      Assessment:    ICD-10-CM    1. Migraine with aura and without status migrainosus, not intractable  G43.109 topiramate (TOPAMAX) 25 MG tablet      2. History of stroke  Z86.73 aspirin 81 MG EC tablet      3. Vision changes  H53.9 topiramate (TOPAMAX) 25 MG tablet      4. Encounter for long-term (current) use of medications  Z79.899 aspirin 81 MG EC tablet          Ms. Ordoñez is a pleasant 38-year-old woman with history of stroke, craniotomy, PFO closure here for follow-up regarding episodic symptoms.  She had a work-up for seizure and was started on Depakote but is experiencing side effects.  It seems that this was likely started for migraine rather than seizure prevention.  I suggested we switch to Topamax.  Potential side effects were reviewed.  Again this can have a benefit from both an antiseizure and headache standpoint.  With regards to her blood thinners, I recommend she continue to try to reach her neurosurgeon.  From my standpoint I do not see why she cannot switch to aspirin monotherapy.  She does also have follow-up with cardiology so their opinion should also be obtained.  We will follow-up again in a few months.  Samia understands and agrees with the plan.    Plan:  -- Let's stop the Depakote and start you on Topamax.  -- Start by taking 1 tablet (25mg) in the evening for 1 week, then increase to twice daily dosing for 1 week, then increase the evening dose to 50mg and continue the 25mg in the morning for 1 week and finally 50mg twice daily.  -- I think you can probably switch to a  full-strength aspirin but I would check back in with your cardiologist and neurosurgeon, as we discussed.  -- Return to neurology clinic in 3-4 months.     Total Time: 30 minutes were spent with the patient and in chart review/documentation (as described above in Assessment and Plan)/coordinating the care on date of service.    Sarah Ca MD  Neurology    Dragon software used in the dictation of this note.                    Again, thank you for allowing me to participate in the care of your patient.        Sincerely,        Sarah Ca MD

## 2023-07-25 NOTE — PROGRESS NOTES
ESTABLISHED PATIENT NEUROLOGY NOTE    DATE OF VISIT: 7/25/2023  MRN: [id number]  PATIENT NAME: Samia Ordoñez  YOB: 1985    Chief Complaint   Patient presents with    Stroke     On Depakote for aura headache; will cause sinus/vision change  Currently taking Plavix and Asprin   April 20; craniotomy      SUBJECTIVE:                                                      HISTORY OF PRESENT ILLNESS:  Samia is here for follow up regarding stroke and headaches.  Imaging showed findings consistent with moyamoya.  She has additional history of PFO closure.  And then more recently had a craniotomy, s/p encephalodurosynangiosis.     She is currently on aspirin and Plavix.  She was started on Depakote for headache prevention a couple of months ago by the consulting neurologist at [facility].  She was describing an aura of visual changes at that time.  She was evaluated for seizure, EEG was negative.  Samia says she was worried that she was having  another stroke.  Since starting the Depakote she still has some of her aura symptoms around the time of menstruation.  She is having problems with fogginess on the medication, decreased sex drive.  She would like to be on something else if possible.    She does continue to work with Neurosurgery and is waiting to hear back about aspirin/Plavix.     CURRENT MEDICATIONS:   clopidogrel (PLAVIX) 75 MG tablet, Take 75 mg by mouth  losartan (COZAAR) 25 MG tablet,   naproxen (NAPROSYN) 500 MG tablet, Take 500 mg by mouth 2 times daily as needed for moderate pain  albuterol (PROAIR HFA/PROVENTIL HFA/VENTOLIN HFA) 108 (90 Base) MCG/ACT inhaler, Inhale 2 puffs into the lungs every 6 hours as needed for shortness of breath / dyspnea or wheezing    No current facility-administered medications on file prior to visit.      RECENT DIAGNOSTIC STUDIES:   Labs: No results found for any visits on 07/25/23.    Imaging:   MRI/MRA (5.10.23):  HEAD MRI:   1.  No acute/subacute infarct, mass,  hemorrhage or hydrocephalus.   2.  Left temporal/parietal craniotomy with a thin cleft of extra-axial fluid deep to the craniotomy measuring 5 mm in thickness.   3.  Normal infarcts left cerebral hemisphere as described.     HEAD MRA:   1.  Chronic occlusion of the left M1 segment compatible with a history of moyamoya.   2.  Interval left EC to IC bypass.   3.  No new branch occlusion, aneurysm or AVM.     NECK MRA:   No measurable stenosis or dissection.     Electroencephalogram (5.10.23):  Clinical Correlation and Recommendations:   This EEG demonstrates focal abnormalities of the left temporal   region.  Focal abnormalities imply localized cortical dysfunction   and may be related to structural, vascular, or other   epileptogenic pathologies.  Clinical correlation is required.     REVIEW OF SYSTEMS:                                                      10-point review of systems is negative except as mentioned above in HPI.    EXAM:                                                      Physical Exam:   Vitals: /75   Pulse 92   BMI= There is no height or weight on file to calculate BMI.  GENERAL: NAD.  HEENT: NC/AT.  CV: RRR. S1, S2.   NECK: No bruits.  PULM: Non-labored breathing.   Neurologic:  MENTAL STATUS: Alert, attentive. Speech is fluent. Normal comprehension. Normal concentration. Adequate fund of knowledge.   CRANIAL NERVES: Discs flat. Visual fields intact to confrontation. Pupils equally, round and reactive to light. Facial sensation and movement normal. EOM full. Hearing intact to conversation.Trapezius strength intact. Palate moves symmetrically. Tongue midline.  MOTOR: 5/5 in proximal and distal muscle groups of upper and lower extremities. Tone and bulk normal.   DTRs: Intact and symmetric in biceps, BR, patellae.  Babinski down-going bilaterally.   SENSATION: Normal light touch throughout.  COORDINATION: Normal finger nose finger. Finger tapping normal. Knee heel shin normal.  STATION AND  GAIT: Romberg Negative. Good postural reflexes. Casual gait is normal.    ASSESSMENT and PLAN:                                                      Assessment:    ICD-10-CM    1. Migraine with aura and without status migrainosus, not intractable  G43.109 topiramate (TOPAMAX) 25 MG tablet      2. History of stroke  Z86.73 aspirin 81 MG EC tablet      3. Vision changes  H53.9 topiramate (TOPAMAX) 25 MG tablet      4. Encounter for long-term (current) use of medications  Z79.899 aspirin 81 MG EC tablet          Ms. Ordoñez is a pleasant 38-year-old woman with history of stroke, craniotomy, PFO closure here for follow-up regarding episodic symptoms.  She had a work-up for seizure and was started on Depakote but is experiencing side effects.  It seems that this was likely started for migraine rather than seizure prevention.  I suggested we switch to Topamax.  Potential side effects were reviewed.  Again this can have a benefit from both an antiseizure and headache standpoint.  With regards to her blood thinners, I recommend she continue to try to reach her neurosurgeon.  From my standpoint I do not see why she cannot switch to aspirin monotherapy.  She does also have follow-up with cardiology so their opinion should also be obtained.  We will follow-up again in a few months.  Samia understands and agrees with the plan.    Plan:  -- Let's stop the Depakote and start you on Topamax.  -- Start by taking 1 tablet (25mg) in the evening for 1 week, then increase to twice daily dosing for 1 week, then increase the evening dose to 50mg and continue the 25mg in the morning for 1 week and finally 50mg twice daily.  -- I think you can probably switch to a full-strength aspirin but I would check back in with your cardiologist and neurosurgeon, as we discussed.  -- Return to neurology clinic in 3-4 months.     Total Time: 30 minutes were spent with the patient and in chart review/documentation (as described above in Assessment and  Plan)/coordinating the care on date of service.    Sarah Ca MD  Neurology    Dragon software used in the dictation of this note.

## 2023-08-06 ENCOUNTER — HEALTH MAINTENANCE LETTER (OUTPATIENT)
Age: 38
End: 2023-08-06

## 2023-09-29 DIAGNOSIS — G43.109 MIGRAINE WITH AURA AND WITHOUT STATUS MIGRAINOSUS, NOT INTRACTABLE: ICD-10-CM

## 2023-09-29 DIAGNOSIS — H53.9 VISION CHANGES: ICD-10-CM

## 2023-09-29 NOTE — TELEPHONE ENCOUNTER
Refill request for: topiramate 25mg   Directions: 25mg QPM for one week, then 25mg BID for one week, then 25mg QAM and 50mg QPM for one week and finally 50mg BID     Left message for pt to call back. Wanting to clarify what dosage pt is currently taking of the topiramate.      Lauren Palacios LPN on 9/29/2023 at 2:55 PM

## 2023-09-29 NOTE — TELEPHONE ENCOUNTER
[facility] sent a request for a new Rx of Topiramate 25 mg  no directions. 90 day supply. Prev filled at [facility].

## 2023-09-29 NOTE — TELEPHONE ENCOUNTER
Pt returned call, she states she is currently taking topiramate 25mg tablest, 2 tablets BID.  Pt states she has enough medication right now and is ok to wait until Dr. Ca returns on 10/3/23.  Medication T'd for review and signature      Lauren Palacios LPN on 9/29/2023 at 3:25 PM

## 2023-10-02 RX ORDER — TOPIRAMATE 25 MG/1
50 TABLET, FILM COATED ORAL 2 TIMES DAILY
Qty: 360 TABLET | Refills: 3 | Status: SHIPPED | OUTPATIENT
Start: 2023-10-02

## 2024-03-03 ENCOUNTER — HEALTH MAINTENANCE LETTER (OUTPATIENT)
Age: 39
End: 2024-03-03

## 2024-09-28 ENCOUNTER — HEALTH MAINTENANCE LETTER (OUTPATIENT)
Age: 39
End: 2024-09-28

## (undated) RX ORDER — FENTANYL CITRATE 50 UG/ML
INJECTION, SOLUTION INTRAMUSCULAR; INTRAVENOUS
Status: DISPENSED
Start: 2021-12-29

## (undated) RX ORDER — LIDOCAINE HYDROCHLORIDE 20 MG/ML
SOLUTION OROPHARYNGEAL
Status: DISPENSED
Start: 2021-12-29